# Patient Record
Sex: FEMALE | Race: BLACK OR AFRICAN AMERICAN | URBAN - METROPOLITAN AREA
[De-identification: names, ages, dates, MRNs, and addresses within clinical notes are randomized per-mention and may not be internally consistent; named-entity substitution may affect disease eponyms.]

---

## 2017-05-10 LAB
ABO + RH BLD: NORMAL
ABO + RH BLD: NORMAL
BLD GP AB SCN SERPL QL: NORMAL
C TRACH DNA SPEC QL PROBE+SIG AMP: NORMAL
CULTURE MICRO: NORMAL
HBV SURFACE AG SERPL QL IA: NORMAL
HEMOGLOBIN: 12.3 G/DL (ref 11.7–15.7)
HIV 1+2 AB+HIV1 P24 AG SERPL QL IA: NORMAL
N GONORRHOEA DNA SPEC QL PROBE+SIG AMP: NORMAL
PLATELET # BLD AUTO: 347 10^9/L
RUBELLA ANTIBODY IGG QUANTITATIVE: NORMAL IU/ML
T PALLIDUM IGG SER QL: NORMAL

## 2017-10-11 ENCOUNTER — PRENATAL OFFICE VISIT (OUTPATIENT)
Dept: OBGYN | Facility: CLINIC | Age: 22
End: 2017-10-11

## 2017-10-11 DIAGNOSIS — Z53.9 NO SHOW: Primary | ICD-10-CM

## 2017-10-11 NOTE — MR AVS SNAPSHOT
"              After Visit Summary   10/11/2017    Ny Carson    MRN: [id number]           Patient Information     Date Of Birth          1995        Visit Information        Provider Department      10/11/2017 1:00 PM Artem Tiwari APRN CNM [facility]        Today's Diagnoses     NO SHOW    -  1       Follow-ups after your visit        Who to contact     If you have questions or need follow up information about today's clinic visit or your schedule please contact [facility] directly at [phone].  Normal or non-critical lab and imaging results will be communicated to you by Skycurehart, letter or phone within 4 business days after the clinic has received the results. If you do not hear from us within 7 days, please contact the clinic through [facility] or phone. If you have a critical or abnormal lab result, we will notify you by phone as soon as possible.  Submit refill requests through PlayCrafter or call your pharmacy and they will forward the refill request to us. Please allow 3 business days for your refill to be completed.          Additional Information About Your Visit        MyChart Information     PlayCrafter lets you send messages to your doctor, view your test results, renew your prescriptions, schedule appointments and more. To sign up, go to [email or web address] . Click on \"Log in\" on the left side of the screen, which will take you to the Welcome page. Then click on \"Sign up Now\" on the right side of the page.     You will be asked to enter the access code listed below, as well as some personal information. Please follow the directions to create your username and password.     Your access code is: [email or web address]  Expires: 2018  3:14 PM     Your access code will  in 90 days. If you need help or a new code, please call your [facility] or [phone].        Care EveryWhere ID     This is your Care EveryWhere ID. This could be used by other organizations to " access your [place] medical records  [id number]         Blood Pressure from Last 3 Encounters:   No data found for BP    Weight from Last 3 Encounters:   No data found for Wt              We Performed the Following     ABO and Rh     Anti Treponema     CBC with platelets     Chlamydia trachomatis PCR     Hepatitis B surface antigen     HIV Antigen Antibody Combo     Neisseria gonorrhoeae PCR     OB hemoglobin     Rubella Antibody IgG Quantitative     Urine Culture Aerobic Bacterial        Primary Care Provider    None Specified       No primary provider on file.        Equal Access to Services     MARTIN VELEZ : Hadii aad ku hadasho Soomaali, waaxda luqadaha, qaybta kaalmada adeegyada, waxay idiin hayfransiscon adeeg deborah laTerryfransiscon . So [facility] [phone].    ATENCIÓN: Si habla alejandro, tiene a reece disposición servicios gratuitos de asistencia lingüística. Llame al [phone].    We comply with applicable federal civil rights laws and [place] laws. We do not discriminate on the basis of race, color, national origin, age, disability, sex, sexual orientation, or gender identity.            Thank you!     Thank you for choosing [facility]  for your care. Our goal is always to provide you with excellent care. Hearing back from our patients is one way we can continue to improve our services. Please take a few minutes to complete the written survey that you may receive in the mail after your visit with us. Thank you!             Your Updated Medication List - Protect others around you: Learn how to safely use, store and throw away your medicines at www.disposemymeds.org.      Notice  As of 10/11/2017  3:14 PM    You have not been prescribed any medications.

## 2017-10-11 NOTE — PROGRESS NOTES
This patient was a no show for this scheduled appointment.   No show for Transfer of Care visit.    JOSÉ

## 2017-11-08 ENCOUNTER — PRENATAL OFFICE VISIT (OUTPATIENT)
Dept: OBGYN | Facility: CLINIC | Age: 22
End: 2017-11-08
Payer: COMMERCIAL

## 2017-11-08 VITALS
WEIGHT: 219.5 LBS | DIASTOLIC BLOOD PRESSURE: 70 MMHG | HEIGHT: 63 IN | BODY MASS INDEX: 38.89 KG/M2 | SYSTOLIC BLOOD PRESSURE: 122 MMHG

## 2017-11-08 DIAGNOSIS — E55.9 VITAMIN D DEFICIENCY: ICD-10-CM

## 2017-11-08 DIAGNOSIS — Z34.80 NORMAL PREGNANCY IN MULTIGRAVIDA: ICD-10-CM

## 2017-11-08 PROBLEM — Z28.39 SUSCEPTIBLE TO VARICELLA (NON-IMMUNE), CURRENTLY PREGNANT: Status: ACTIVE | Noted: 2017-05-17

## 2017-11-08 PROBLEM — Z23 NEED FOR TDAP VACCINATION: Status: ACTIVE | Noted: 2017-11-08

## 2017-11-08 PROBLEM — O09.899 SUSCEPTIBLE TO VARICELLA (NON-IMMUNE), CURRENTLY PREGNANT: Status: ACTIVE | Noted: 2017-05-17

## 2017-11-08 PROCEDURE — 99207 ZZC PRENATAL VISIT: CPT | Performed by: ADVANCED PRACTICE MIDWIFE

## 2017-11-08 ASSESSMENT — ANXIETY QUESTIONNAIRES
5. BEING SO RESTLESS THAT IT IS HARD TO SIT STILL: SEVERAL DAYS
GAD7 TOTAL SCORE: 11
3. WORRYING TOO MUCH ABOUT DIFFERENT THINGS: MORE THAN HALF THE DAYS
2. NOT BEING ABLE TO STOP OR CONTROL WORRYING: SEVERAL DAYS
IF YOU CHECKED OFF ANY PROBLEMS ON THIS QUESTIONNAIRE, HOW DIFFICULT HAVE THESE PROBLEMS MADE IT FOR YOU TO DO YOUR WORK, TAKE CARE OF THINGS AT HOME, OR GET ALONG WITH OTHER PEOPLE: SOMEWHAT DIFFICULT
7. FEELING AFRAID AS IF SOMETHING AWFUL MIGHT HAPPEN: MORE THAN HALF THE DAYS
6. BECOMING EASILY ANNOYED OR IRRITABLE: MORE THAN HALF THE DAYS
1. FEELING NERVOUS, ANXIOUS, OR ON EDGE: SEVERAL DAYS

## 2017-11-08 ASSESSMENT — PATIENT HEALTH QUESTIONNAIRE - PHQ9
SUM OF ALL RESPONSES TO PHQ QUESTIONS 1-9: 3
5. POOR APPETITE OR OVEREATING: MORE THAN HALF THE DAYS

## 2017-11-08 NOTE — PROGRESS NOTES
32w4d  Patient here at [facility] for transfer of care from [facility] and [facility]. States had 1 hour GCT at [place] attempting to get lab works done.  Began care 6/7/17 @ [facility] at 11 weeks gestation.   Pt states regular monthly appts. There. Not seen on transfer records at this time.     PHQ 9 today; 3  ALISHA 7 = 12, states feels more anxiety because she had a nice normal birth last time and says she is very nervous this will not happen again.  Reassured.  Pt had  last time at [facility] and is interested in a  this time. Will give  program information.   Pt states has her mother with her usually but sometimes makes her more nervous.   Reviewed what JULES che MD does and would still encourage  if she would like.  CNM service discussed.  Pt stopped smoking with this pregnancy.  Agrees to 049 study. Patient works in  usually sitting.   RTC in 2 weeks mrb  Please discuss at next visit  Vitamin D daily, need for Tdap. mrb    Pelvis proven to 6 lbs 8 oz.  mrb

## 2017-11-08 NOTE — MR AVS SNAPSHOT
"              After Visit Summary   2017    Ny Carson    MRN: [id number]           Patient Information     Date Of Birth          1995        Visit Information        Provider Department      2017 1:30 PM Audrey Parker APRN CNM [facility]        Today's Diagnoses     Normal pregnancy in multigravida        Vitamin D deficiency           Follow-ups after your visit        Who to contact     If you have questions or need follow up information about today's clinic visit or your schedule please contact [facility] directly at [phone].  Normal or non-critical lab and imaging results will be communicated to you by Coupadhart, letter or phone within 4 business days after the clinic has received the results. If you do not hear from us within 7 days, please contact the clinic through Coupadhart or phone. If you have a critical or abnormal lab result, we will notify you by phone as soon as possible.  Submit refill requests through Enthuse or call your pharmacy and they will forward the refill request to us. Please allow 3 business days for your refill to be completed.          Additional Information About Your Visit        MyChart Information     Enthuse lets you send messages to your doctor, view your test results, renew your prescriptions, schedule appointments and more. To sign up, go to [email or web address] . Click on \"Log in\" on the left side of the screen, which will take you to the Welcome page. Then click on \"Sign up Now\" on the right side of the page.     You will be asked to enter the access code listed below, as well as some personal information. Please follow the directions to create your username and password.     Your access code is: [email or web address]  Expires: 2018  2:14 PM     Your access code will  in 90 days. If you need help or a new code, please call your [facility] or [phone].        Care EveryWhere ID     This is your Care " "EveryWhere ID. This could be used by other organizations to access your [place] medical records  [id number]        Your Vitals Were     Height BMI (Body Mass Index)                5' 2.5\" (1.588 m) 39.51 kg/m2           Blood Pressure from Last 3 Encounters:   11/08/17 122/70    Weight from Last 3 Encounters:   11/08/17 219 lb 8 oz (99.6 kg)              Today, you had the following     No orders found for display       Primary Care Provider    Physician No Ref-Primary       NO REF-PRIMARY PHYSICIAN        Equal Access to Services     [facility]ARTIN [facility] : Hadii aad ku hadasho Soomaali, waaxda luqadaha, qaybta kaalmada adeegyada, treva tirado . So [facility] [phone].    ATENCIÓN: Si habla español, tiene a reece disposición servicios gratuitos de asistencia lingüística. Llame al [phone].    We comply with applicable federal civil rights laws and [place] laws. We do not discriminate on the basis of race, color, national origin, age, disability, sex, sexual orientation, or gender identity.            Thank you!     Thank you for choosing [facility]  for your care. Our goal is always to provide you with excellent care. Hearing back from our patients is one way we can continue to improve our services. Please take a few minutes to complete the written survey that you may receive in the mail after your visit with us. Thank you!             Your Updated Medication List - Protect others around you: Learn how to safely use, store and throw away your medicines at www.disposemymeds.org.      Notice  As of 11/8/2017  3:52 PM    You have not been prescribed any medications.      "

## 2017-11-09 ASSESSMENT — ANXIETY QUESTIONNAIRES: GAD7 TOTAL SCORE: 11

## 2017-11-29 ENCOUNTER — PRENATAL OFFICE VISIT (OUTPATIENT)
Dept: OBGYN | Facility: CLINIC | Age: 22
End: 2017-11-29
Payer: COMMERCIAL

## 2017-11-29 VITALS — DIASTOLIC BLOOD PRESSURE: 62 MMHG | WEIGHT: 216 LBS | BODY MASS INDEX: 38.88 KG/M2 | SYSTOLIC BLOOD PRESSURE: 112 MMHG

## 2017-11-29 DIAGNOSIS — Z34.80 NORMAL PREGNANCY IN MULTIGRAVIDA: Primary | ICD-10-CM

## 2017-11-29 LAB — HEMOGLOBIN: 10.9 G/DL (ref 11.7–15.7)

## 2017-11-29 PROCEDURE — 87591 N.GONORRHOEAE DNA AMP PROB: CPT | Performed by: ADVANCED PRACTICE MIDWIFE

## 2017-11-29 PROCEDURE — 99207 ZZC PRENATAL VISIT: CPT | Performed by: ADVANCED PRACTICE MIDWIFE

## 2017-11-29 PROCEDURE — 87491 CHLMYD TRACH DNA AMP PROBE: CPT | Performed by: ADVANCED PRACTICE MIDWIFE

## 2017-11-29 PROCEDURE — 87186 SC STD MICRODIL/AGAR DIL: CPT | Performed by: ADVANCED PRACTICE MIDWIFE

## 2017-11-29 PROCEDURE — 87653 STREP B DNA AMP PROBE: CPT | Performed by: ADVANCED PRACTICE MIDWIFE

## 2017-11-29 PROCEDURE — 00000218 HCL OB HEMOGLOBIN (NS/SS): Performed by: ADVANCED PRACTICE MIDWIFE

## 2017-11-29 PROCEDURE — 36415 COLL VENOUS BLD VENIPUNCTURE: CPT | Performed by: ADVANCED PRACTICE MIDWIFE

## 2017-11-29 NOTE — PROGRESS NOTES
Here today for PNV.  36 week labs done.  Is having some ctx that are regular and has noted cervical mucous.  Working but has been off 2 days with sick son at home with Dx of pneumonia and on antibiotics.  Fever controlled.    ASSESSMENT: 35w4d   PLAN: RTC weekly   GC/Chlamydia/GBS today.  Needs Tdap as not done at [place].    JOSÉ

## 2017-11-29 NOTE — MR AVS SNAPSHOT
"              After Visit Summary   2017    Ny Carson    MRN: [id number]           Patient Information     Date Of Birth          1995        Visit Information        Provider Department      2017 2:30 PM Artem Tiwari APRN [facility]        Today's Diagnoses     Normal pregnancy in multigravida    -  1       Follow-ups after your visit        Who to contact     If you have questions or need follow up information about today's clinic visit or your schedule please contact [facility] directly at [phone].  Normal or non-critical lab and imaging results will be communicated to you by Targeted Instant Communicationshart, letter or phone within 4 business days after the clinic has received the results. If you do not hear from us within 7 days, please contact the clinic through [employer]Cast Networkst or phone. If you have a critical or abnormal lab result, we will notify you by phone as soon as possible.  Submit refill requests through [employer] or call your pharmacy and they will forward the refill request to us. Please allow 3 business days for your refill to be completed.          Additional Information About Your Visit        MyChart Information     [employer] lets you send messages to your doctor, view your test results, renew your prescriptions, schedule appointments and more. To sign up, go to [email or web address] . Click on \"Log in\" on the left side of the screen, which will take you to the Welcome page. Then click on \"Sign up Now\" on the right side of the page.     You will be asked to enter the access code listed below, as well as some personal information. Please follow the directions to create your username and password.     Your access code is: [email or web address]  Expires: 2018  2:14 PM     Your access code will  in 90 days. If you need help or a new code, please call your [facility] or [phone].        Care EveryWhere ID     This is your Care EveryWhere ID. This could be used " by other organizations to access your [place] medical records  [id number]        Your Vitals Were     BMI (Body Mass Index)                   38.88 kg/m2            Blood Pressure from Last 3 Encounters:   11/29/17 112/62   11/08/17 122/70    Weight from Last 3 Encounters:   11/29/17 216 lb (98 kg)   11/08/17 219 lb 8 oz (99.6 kg)              We Performed the Following     CHLAMYDIA TRACHOMATIS PCR     Group B strep PCR     NEISSERIA GONORRHOEA PCR     OB HEMOGLOBIN (NS/SS)        Primary Care Provider Fax #    Physician No Ref-Primary [phone]       No address on file        Equal Access to Services     ADELINA VELEZ : Hadjacinda Lyons, jose ritter, desiree fierro, treva tirado . So [facility] [phone].    ATENCIÓN: Si habla español, tiene a reece disposición servicios gratuitos de asistencia lingüística. Llame al [phone].    We comply with applicable federal civil rights laws and [place] laws. We do not discriminate on the basis of race, color, national origin, age, disability, sex, sexual orientation, or gender identity.            Thank you!     Thank you for choosing [facility]  for your care. Our goal is always to provide you with excellent care. Hearing back from our patients is one way we can continue to improve our services. Please take a few minutes to complete the written survey that you may receive in the mail after your visit with us. Thank you!             Your Updated Medication List - Protect others around you: Learn how to safely use, store and throw away your medicines at www.disposemymeds.org.      Notice  As of 11/29/2017  3:48 PM    You have not been prescribed any medications.

## 2017-11-30 LAB
C TRACH DNA SPEC QL NAA+PROBE: NEGATIVE
GP B STREP DNA SPEC QL NAA+PROBE: POSITIVE
N GONORRHOEA DNA SPEC QL NAA+PROBE: NEGATIVE
SPECIMEN SOURCE: ABNORMAL
SPECIMEN SOURCE: NORMAL
SPECIMEN SOURCE: NORMAL

## 2017-12-01 PROBLEM — O98.819 GROUP B STREPTOCOCCAL INFECTION DURING PREGNANCY: Status: ACTIVE | Noted: 2017-12-01

## 2017-12-01 PROBLEM — B95.1 GROUP B STREPTOCOCCAL INFECTION DURING PREGNANCY: Status: ACTIVE | Noted: 2017-12-01

## 2017-12-05 LAB
BACTERIA SPEC CULT: ABNORMAL
SPECIMEN SOURCE: ABNORMAL

## 2017-12-06 ENCOUNTER — PRENATAL OFFICE VISIT (OUTPATIENT)
Dept: OBGYN | Facility: CLINIC | Age: 22
End: 2017-12-06
Payer: COMMERCIAL

## 2017-12-06 VITALS — WEIGHT: 218 LBS | SYSTOLIC BLOOD PRESSURE: 118 MMHG | BODY MASS INDEX: 39.24 KG/M2 | DIASTOLIC BLOOD PRESSURE: 70 MMHG

## 2017-12-06 DIAGNOSIS — Z23 NEED FOR TDAP VACCINATION: ICD-10-CM

## 2017-12-06 DIAGNOSIS — B00.9 HSV (HERPES SIMPLEX VIRUS) INFECTION: ICD-10-CM

## 2017-12-06 DIAGNOSIS — B37.31 YEAST INFECTION OF THE VAGINA: Primary | ICD-10-CM

## 2017-12-06 DIAGNOSIS — Z34.80 NORMAL PREGNANCY IN MULTIGRAVIDA: ICD-10-CM

## 2017-12-06 PROCEDURE — 99207 ZZC PRENATAL VISIT: CPT | Performed by: ADVANCED PRACTICE MIDWIFE

## 2017-12-06 RX ORDER — VALACYCLOVIR HYDROCHLORIDE 500 MG/1
500 TABLET, FILM COATED ORAL 2 TIMES DAILY
Qty: 60 TABLET | Refills: 1 | Status: SHIPPED | OUTPATIENT
Start: 2017-12-06 | End: 2017-12-13

## 2017-12-06 RX ORDER — MICONAZOLE NITRATE 2 %
1 CREAM WITH APPLICATOR VAGINAL AT BEDTIME
Qty: 45 G | Refills: 1 | Status: ON HOLD | OUTPATIENT
Start: 2017-12-06 | End: 2017-12-25

## 2017-12-06 NOTE — MR AVS SNAPSHOT
"              After Visit Summary   2017    Ny Carson    MRN: [id number]           Patient Information     Date Of Birth          1995        Visit Information        Provider Department      2017 3:00 PM Audrey Parker APRN CNM [facility]        Today's Diagnoses     Yeast infection of the vagina    -  1    Normal pregnancy in multigravida        HSV (herpes simplex virus) infection        Need for Tdap vaccination           Follow-ups after your visit        Who to contact     If you have questions or need follow up information about today's clinic visit or your schedule please contact [facility] directly at [phone].  Normal or non-critical lab and imaging results will be communicated to you by VIDDIXhart, letter or phone within 4 business days after the clinic has received the results. If you do not hear from us within 7 days, please contact the clinic through MyChart or phone. If you have a critical or abnormal lab result, we will notify you by phone as soon as possible.  Submit refill requests through Waicai or call your pharmacy and they will forward the refill request to us. Please allow 3 business days for your refill to be completed.          Additional Information About Your Visit        MyChart Information     Waicai lets you send messages to your doctor, view your test results, renew your prescriptions, schedule appointments and more. To sign up, go to [email or web address] . Click on \"Log in\" on the left side of the screen, which will take you to the Welcome page. Then click on \"Sign up Now\" on the right side of the page.     You will be asked to enter the access code listed below, as well as some personal information. Please follow the directions to create your username and password.     Your access code is: [email or web address]  Expires: 2018  2:14 PM     Your access code will  in 90 days. If you need help or a new code, please " call your [place] clinic or [phone].        Care EveryWhere ID     This is your Care EveryWhere ID. This could be used by other organizations to access your [place] medical records  [id number]        Your Vitals Were     BMI (Body Mass Index)                   39.24 kg/m2            Blood Pressure from Last 3 Encounters:   12/06/17 118/70   11/29/17 112/62   11/08/17 122/70    Weight from Last 3 Encounters:   12/06/17 218 lb (98.9 kg)   11/29/17 216 lb (98 kg)   11/08/17 219 lb 8 oz (99.6 kg)              We Performed the Following     Wet prep          Today's Medication Changes          These changes are accurate as of: 12/6/17  3:48 PM.  If you have any questions, ask your nurse or doctor.               Start taking these medicines.        Dose/Directions    miconazole 2 % cream   Commonly known as:  CVS MICONAZOLE 7   Used for:  Yeast infection of the vagina        Dose:  1 applicator   Place 1 applicator vaginally At Bedtime   Quantity:  45 g   Refills:  1       valACYclovir 500 MG tablet   Commonly known as:  VALTREX   Used for:  HSV (herpes simplex virus) infection, Normal pregnancy in multigravida        Dose:  500 mg   Take 1 tablet (500 mg) by mouth 2 times daily   Quantity:  60 tablet   Refills:  1            Where to get your medicines      These medications were sent to [facility] [address] [place] [address] [address] [address]     Phone:  [phone]     miconazole 2 % cream         Some of these will need a paper prescription and others can be bought over the counter.  Ask your nurse if you have questions.     Bring a paper prescription for each of these medications     valACYclovir 500 MG tablet                Primary Care Provider Fax #    Physician No Ref-Primary [phone]       No address on file        Equal Access to Services     MARTIN VELEZ AH: jose Brenner, desiree cadena  treva fierrorhona jesus albertodaylin aguileraaan ah. Catherine [facility] [phone].    ATENCIÓN: Si habla alejandro, tiene a reece disposición servicios gratuitos de asistencia lingüística. Kevon al [phone].    We comply with applicable federal civil rights laws and [place] laws. We do not discriminate on the basis of race, color, national origin, age, disability, sex, sexual orientation, or gender identity.            Thank you!     Thank you for choosing [facility]  for your care. Our goal is always to provide you with excellent care. Hearing back from our patients is one way we can continue to improve our services. Please take a few minutes to complete the written survey that you may receive in the mail after your visit with us. Thank you!             Your Updated Medication List - Protect others around you: Learn how to safely use, store and throw away your medicines at www.disposemymeds.org.          This list is accurate as of: 12/6/17  3:48 PM.  Always use your most recent med list.                   Brand Name Dispense Instructions for use Diagnosis    miconazole 2 % cream    CVS MICONAZOLE 7    45 g    Place 1 applicator vaginally At Bedtime    Yeast infection of the vagina       valACYclovir 500 MG tablet    VALTREX    60 tablet    Take 1 tablet (500 mg) by mouth 2 times daily    HSV (herpes simplex virus) infection, Normal pregnancy in multigravida

## 2017-12-06 NOTE — PROGRESS NOTES
36w4d  Reviewed + GBS pt has had with last pregnancy aware of antibiotics.  Pt feels she has yeast infection, vaginal itching, irritation, denies odor.  Wet prep + yeast, will give monostat 7 with instructions.   Tdap given today.  Labor signs and symptoms reviewed.  Pt states she needs prophylaxis for herpes, not noted on problem list but gave prescription for suppression starting today.    rtc in 1 week mrb

## 2017-12-08 LAB
SPECIMEN SOURCE: NORMAL
WET PREP SPEC: NORMAL

## 2017-12-13 ENCOUNTER — PRENATAL OFFICE VISIT (OUTPATIENT)
Dept: OBGYN | Facility: CLINIC | Age: 22
End: 2017-12-13
Payer: COMMERCIAL

## 2017-12-13 VITALS — DIASTOLIC BLOOD PRESSURE: 60 MMHG | SYSTOLIC BLOOD PRESSURE: 112 MMHG | WEIGHT: 217 LBS | BODY MASS INDEX: 39.06 KG/M2

## 2017-12-13 DIAGNOSIS — B00.9 HSV (HERPES SIMPLEX VIRUS) INFECTION: ICD-10-CM

## 2017-12-13 DIAGNOSIS — O98.819 GROUP B STREPTOCOCCAL INFECTION DURING PREGNANCY: Primary | ICD-10-CM

## 2017-12-13 DIAGNOSIS — B95.1 GROUP B STREPTOCOCCAL INFECTION DURING PREGNANCY: Primary | ICD-10-CM

## 2017-12-13 DIAGNOSIS — Z34.80 NORMAL PREGNANCY IN MULTIGRAVIDA: ICD-10-CM

## 2017-12-13 PROCEDURE — 99207 ZZC PRENATAL VISIT: CPT | Performed by: ADVANCED PRACTICE MIDWIFE

## 2017-12-13 RX ORDER — VALACYCLOVIR HYDROCHLORIDE 500 MG/1
500 TABLET, FILM COATED ORAL 2 TIMES DAILY
Qty: 60 TABLET | Refills: 1 | Status: ON HOLD | OUTPATIENT
Start: 2017-12-13 | End: 2017-12-25

## 2017-12-13 NOTE — PROGRESS NOTES
Doing well.  Did not get Valtrex as not at Pharmacy so given both a paper Rx and a reorder electronically to [facility].  Denies eruption this pregnancy.  Took Yeast meds that did go to Pharmacy.  Some L side pain that she went to [facility] and dx of lateral rectus abdomen pain.  No regular ctx.  Did not have pelvic exam.   ASSESSMENT: 37w4d   HSV + hx, GBS +,  PLAN: RTC weekly until delivery.   JOSÉ

## 2017-12-13 NOTE — MR AVS SNAPSHOT
"              After Visit Summary   2017    Ny Carson    MRN: [id number]           Patient Information     Date Of Birth          1995        Visit Information        Provider Department      2017 2:30 PM Artem Tiwari APRN CN[facility]        Today's Diagnoses     Group B streptococcal infection during pregnancy    -  1    HSV (herpes simplex virus) infection        Normal pregnancy in multigravida           Follow-ups after your visit        Who to contact     If you have questions or need follow up information about today's clinic visit or your schedule please contact [facility] directly at [phone].  Normal or non-critical lab and imaging results will be communicated to you by PharmacoPhotonicshart, letter or phone within 4 business days after the clinic has received the results. If you do not hear from us within 7 days, please contact the clinic through PharmacoPhotonicshart or phone. If you have a critical or abnormal lab result, we will notify you by phone as soon as possible.  Submit refill requests through [employer] Resolve or call your pharmacy and they will forward the refill request to us. Please allow 3 business days for your refill to be completed.          Additional Information About Your Visit        MyChart Information     [employer] lets you send messages to your doctor, view your test results, renew your prescriptions, schedule appointments and more. To sign up, go to [email or web address] . Click on \"Log in\" on the left side of the screen, which will take you to the Welcome page. Then click on \"Sign up Now\" on the right side of the page.     You will be asked to enter the access code listed below, as well as some personal information. Please follow the directions to create your username and password.     Your access code is: [email or web address]  Expires: 2018  2:14 PM     Your access code will  in 90 days. If you need help or a new code, please call your [place] " clinic or [phone].        Care EveryWhere ID     This is your Care EveryWhere ID. This could be used by other organizations to access your [place] medical records  [id number]        Your Vitals Were     BMI (Body Mass Index)                   39.06 kg/m2            Blood Pressure from Last 3 Encounters:   12/13/17 112/60   12/06/17 118/70   11/29/17 112/62    Weight from Last 3 Encounters:   12/13/17 217 lb (98.4 kg)   12/06/17 218 lb (98.9 kg)   11/29/17 216 lb (98 kg)              Today, you had the following     No orders found for display         Where to get your medicines      These medications were sent to [facility] [address] [place] [address], [place] [address]  [address] [facility] [address]     Phone:  [phone]     valACYclovir 500 MG tablet          Primary Care Provider Fax #    Physician No Ref-Primary [phone]       No address on file        Equal Access to Services     MARTIN EVLEZ : Hadii angle ku hadasho Soomaali, waaxda luqadaha, qaybta kaalmada adeegyada, waxay markin rinku tirado . So [facility] [phone].    ATENCIÓN: Si habla español, tiene a reece disposición servicios gratuitos de asistencia lingüística. LlRegency [facility] West [phone].    We comply with applicable federal civil rights laws and [place] laws. We do not discriminate on the basis of race, color, national origin, age, disability, sex, sexual orientation, or gender identity.            Thank you!     Thank you for choosing [facility]  for your care. Our goal is always to provide you with excellent care. Hearing back from our patients is one way we can continue to improve our services. Please take a few minutes to complete the written survey that you may receive in the mail after your visit with us. Thank you!             Your Updated Medication List - Protect others around you: Learn how to safely use, store and throw away your medicines at  www.disposemymeds.org.          This list is accurate as of: 12/13/17  3:09 PM.  Always use your most recent med list.                   Brand Name Dispense Instructions for use Diagnosis    miconazole 2 % cream    CVS MICONAZOLE 7    45 g    Place 1 applicator vaginally At Bedtime    Yeast infection of the vagina       valACYclovir 500 MG tablet    VALTREX    60 tablet    Take 1 tablet (500 mg) by mouth 2 times daily    HSV (herpes simplex virus) infection, Normal pregnancy in multigravida

## 2017-12-20 ENCOUNTER — PRENATAL OFFICE VISIT (OUTPATIENT)
Dept: OBGYN | Facility: CLINIC | Age: 22
End: 2017-12-20
Payer: COMMERCIAL

## 2017-12-20 VITALS — DIASTOLIC BLOOD PRESSURE: 60 MMHG | SYSTOLIC BLOOD PRESSURE: 112 MMHG | BODY MASS INDEX: 39.06 KG/M2 | WEIGHT: 217 LBS

## 2017-12-20 DIAGNOSIS — B00.9 HSV (HERPES SIMPLEX VIRUS) INFECTION: ICD-10-CM

## 2017-12-20 DIAGNOSIS — Z34.80 NORMAL PREGNANCY IN MULTIGRAVIDA: Primary | ICD-10-CM

## 2017-12-20 DIAGNOSIS — O98.819 GROUP B STREPTOCOCCAL INFECTION DURING PREGNANCY: ICD-10-CM

## 2017-12-20 DIAGNOSIS — B95.1 GROUP B STREPTOCOCCAL INFECTION DURING PREGNANCY: ICD-10-CM

## 2017-12-20 PROCEDURE — 59425 ANTEPARTUM CARE ONLY: CPT | Performed by: ADVANCED PRACTICE MIDWIFE

## 2017-12-20 PROCEDURE — 99207 ZZC PRENATAL VISIT: CPT | Performed by: ADVANCED PRACTICE MIDWIFE

## 2017-12-20 NOTE — MR AVS SNAPSHOT
"              After Visit Summary   2017    Ny Carson    MRN: [id number]           Patient Information     Date Of Birth          1995        Visit Information        Provider Department      2017 3:00 PM Artem Tiwari APRN CNM [facility]        Today's Diagnoses     Normal pregnancy in multigravida    -  1    HSV (herpes simplex virus) infection        Group B streptococcal infection during pregnancy           Follow-ups after your visit        Who to contact     If you have questions or need follow up information about today's clinic visit or your schedule please contact [facility] directly at [phone].  Normal or non-critical lab and imaging results will be communicated to you by [employer], letter or phone within 4 business days after the clinic has received the results. If you do not hear from us within 7 days, please contact the clinic through [employer] or phone. If you have a critical or abnormal lab result, we will notify you by phone as soon as possible.  Submit refill requests through [employer] or call your pharmacy and they will forward the refill request to us. Please allow 3 business days for your refill to be completed.          Additional Information About Your Visit        MyChart Information     [employer] lets you send messages to your doctor, view your test results, renew your prescriptions, schedule appointments and more. To sign up, go to [email or web address] . Click on \"Log in\" on the left side of the screen, which will take you to the Welcome page. Then click on \"Sign up Now\" on the right side of the page.     You will be asked to enter the access code listed below, as well as some personal information. Please follow the directions to create your username and password.     Your access code is: [email or web address]  Expires: 2018  2:14 PM     Your access code will  in 90 days. If you need help or a new code, please call your [place] " clinic or [phone].        Care EveryWhere ID     This is your Care EveryWhere ID. This could be used by other organizations to access your [facility] medical records  [id number]        Your Vitals Were     BMI (Body Mass Index)                   39.06 kg/m2            Blood Pressure from Last 3 Encounters:   12/20/17 112/60   12/13/17 112/60   12/06/17 118/70    Weight from Last 3 Encounters:   12/20/17 217 lb (98.4 kg)   12/13/17 217 lb (98.4 kg)   12/06/17 218 lb (98.9 kg)              Today, you had the following     No orders found for display       Primary Care Provider Fax #    Physician No Ref-Primary [phone]       No address on file        Equal Access to Services     MARTIN VELEZ : Marielle fuchso Serena, waaxda luqadaha, qaybta kaalmada aderhonayachrista, treva tirado . So [facility] [phone].    ATENCIÓN: Si habla español, tiene a reece disposición servicios gratuitos de asistencia lingüística. Llame al [phone].    We comply with applicable federal civil rights laws and [place] laws. We do not discriminate on the basis of race, color, national origin, age, disability, sex, sexual orientation, or gender identity.            Thank you!     Thank you for choosing [facility]  for your care. Our goal is always to provide you with excellent care. Hearing back from our patients is one way we can continue to improve our services. Please take a few minutes to complete the written survey that you may receive in the mail after your visit with us. Thank you!             Your Updated Medication List - Protect others around you: Learn how to safely use, store and throw away your medicines at www.disposemymeds.org.          This list is accurate as of: 12/20/17  4:12 PM.  Always use your most recent med list.                   Brand Name Dispense Instructions for use Diagnosis    miconazole 2 % cream    CVS MICONAZOLE 7    45 g    Place 1 applicator vaginally At  Bedtime    Yeast infection of the vagina       valACYclovir 500 MG tablet    VALTREX    60 tablet    Take 1 tablet (500 mg) by mouth 2 times daily    HSV (herpes simplex virus) infection, Normal pregnancy in multigravida

## 2017-12-20 NOTE — PROGRESS NOTES
Some light ctx and increased cervical mucous.  Had headaches x 3 that resolved with Tylenol.  No hx of GHTN in past and BP is very normal today.  Discussed to call if HA and not controlled by Tylenol.  # to call for labor.  Is taking Valtrex for HSV suppression.  Active fetus.  GBS positive reviewed to call if SROM.   ASSESSMENT: 38w4d   GBS, HSV.  PLAN: RTC weekly.   JOSÉ

## 2017-12-23 ENCOUNTER — HOSPITAL ENCOUNTER (OUTPATIENT)
Facility: CLINIC | Age: 22
Discharge: HOME | End: 2017-12-23
Attending: ADVANCED PRACTICE MIDWIFE | Admitting: ADVANCED PRACTICE MIDWIFE
Payer: COMMERCIAL

## 2017-12-23 VITALS
HEIGHT: 62 IN | WEIGHT: 214 LBS | RESPIRATION RATE: 18 BRPM | DIASTOLIC BLOOD PRESSURE: 77 MMHG | HEART RATE: 94 BPM | BODY MASS INDEX: 39.38 KG/M2 | SYSTOLIC BLOOD PRESSURE: 129 MMHG | TEMPERATURE: 97.8 F

## 2017-12-23 PROCEDURE — 59025 FETAL NON-STRESS TEST: CPT | Mod: 26 | Performed by: ADVANCED PRACTICE MIDWIFE

## 2017-12-23 PROCEDURE — 59025 FETAL NON-STRESS TEST: CPT

## 2017-12-23 PROCEDURE — 99213 OFFICE O/P EST LOW 20 MIN: CPT | Mod: 25

## 2017-12-23 PROCEDURE — 99213 OFFICE O/P EST LOW 20 MIN: CPT | Mod: 25 | Performed by: ADVANCED PRACTICE MIDWIFE

## 2017-12-23 RX ORDER — ONDANSETRON 2 MG/ML
4 INJECTION INTRAMUSCULAR; INTRAVENOUS EVERY 6 HOURS PRN
Status: DISCONTINUED | OUTPATIENT
Start: 2017-12-23 | End: 2017-12-24 | Stop reason: HOSPADM

## 2017-12-23 NOTE — IP AVS SNAPSHOT
[facility]    [address]    [place] [address]    Phone:  [phone]                                       After Visit Summary   12/23/2017    Ny Carson    MRN: [id number]           After Visit Summary Signature Page     I have received my discharge instructions, and my questions have been answered. I have discussed any challenges I see with this plan with the nurse or doctor.    ..........................................................................................................................................  Patient/Patient Representative Signature      ..........................................................................................................................................  Patient Representative Print Name and Relationship to Patient    ..................................................               ................................................  Date                                            Time    ..........................................................................................................................................  Reviewed by Signature/Title    ...................................................              ..............................................  Date                                                            Time

## 2017-12-23 NOTE — IP AVS SNAPSHOT
MRN:[id number]                      After Visit Summary   12/23/2017    Ny Carson    MRN: [id number]           Thank you!     Thank you for choosing [facility] for your care. Our goal is always to provide you with excellent care. Hearing back from our patients is one way we can continue to improve our services. Please take a few minutes to complete the written survey that you may receive in the mail after you visit with us. Thank you!        Patient Information     Date Of Birth          1995        Designated Caregiver       Most Recent Value    Caregiver    Will someone help with your care after discharge? no      About your hospital stay     You were admitted on:  December 23, 2017 You last received care in the:  [facility]    You were discharged on:  December 23, 2017       Who to Call     For medical emergencies, please call 911.  For non-urgent questions about your medical care, please call your primary care provider or clinic, None          Attending Provider     Provider Artem Guerrero APRN [facility]       Primary Care Provider Fax #    Physician No Ref-Primary [phone]      Further instructions from your care team       Discharge Instruction for Undelivered Patients      You were seen for: Labor Assessment  We Consulted: Artem Tiwari CNM  You had (Test or Medicine): Fetal Monitoring and Cervical Exam     Diet:   Drink 8 to 12 glasses of liquids (milk, juice, water) every day.     Activity:  Call your doctor or nurse midwife if your baby is moving less than usual.     Call your provider if you notice:  Swelling in your face or increased swelling in your hands or legs.  Headaches that are not relieved by Tylenol (acetaminophen).  Changes in your vision (blurring: seeing spots or stars.)  Nausea (sick to your stomach) and vomiting (throwing up).   Weight gain of 5 pounds or more per week.  Heartburn that doesn't go away.  Signs of bladder infection: pain when you  "urinate (use the toilet), need to go more often and more urgently.  The bag of arias (rupture of membranes) breaks, or you notice leaking in your underwear.  Bright red blood in your underwear.  Abdominal (lower belly) or stomach pain.  Second (plus) baby: Contractions (tightening) less than 10 minutes apart and getting stronger.    Increase or change in vaginal discharge (note the color and amount)    Follow-up:  As scheduled in the clinic          Pending Results     No orders found from 2017 to 2017.            Statement of Approval     Ordered          17 2154  I have reviewed and agree with all the recommendations and orders detailed in this document.  EFFECTIVE NOW     Approved and electronically signed by:  Artem Tiwari APRN CNM             Admission Information     Date & Time Provider Department Dept. Phone    2017 Artem Tiwari APRN CNM [facility] [phone]      Your Vitals Were     Blood Pressure Pulse Temperature Respirations Height Weight    129/77 94 97.8  F (36.6  C) (Oral) 18 1.575 m (5' 2\") 97.1 kg (214 lb)    BMI (Body Mass Index)                   39.14 kg/m2           MyChart Information     [employer] lets you send messages to your doctor, view your test results, renew your prescriptions, schedule appointments and more. To sign up, go to [email or web address] . Click on \"Log in\" on the left side of the screen, which will take you to the Welcome page. Then click on \"Sign up Now\" on the right side of the page.     You will be asked to enter the access code listed below, as well as some personal information. Please follow the directions to create your username and password.     Your access code is: [email or web address]  Expires: 2018  2:14 PM     Your access code will  in 90 days. If you need help or a new code, please call your [place] clinic or [phone].        Care EveryWhere ID     This is your Care EveryWhere ID. This could be used by other organizations to " access your [facility] medical records  [id number]        Equal Access to Services     MARTIN VELEZ : Hadii aad ku hadstefsandro Soelizabeth, walenda riya, qajankita normaabramchrista sosatenzinchrista, treva grandalaureenalireza mazariegos. So [facility] [phone].    ATENCIÓN: Si habla español, tiene a reece disposición servicios gratuitos de asistencia lingüística. Llame al [phone].    We comply with applicable federal civil rights laws and [place] laws. We do not discriminate on the basis of race, color, national origin, age, disability, sex, sexual orientation, or gender identity.               Review of your medicines      CONTINUE these medicines which have NOT CHANGED        Dose / Directions    miconazole 2 % cream   Commonly known as:  CVS MICONAZOLE 7   Used for:  Yeast infection of the vagina        Dose:  1 applicator   Place 1 applicator vaginally At Bedtime   Quantity:  45 g   Refills:  1       valACYclovir 500 MG tablet   Commonly known as:  VALTREX   Used for:  HSV (herpes simplex virus) infection, Normal pregnancy in multigravida        Dose:  500 mg   Take 1 tablet (500 mg) by mouth 2 times daily   Quantity:  60 tablet   Refills:  1                Protect others around you: Learn how to safely use, store and throw away your medicines at www.disposemymeds.org.             Medication List: This is a list of all your medications and when to take them. Check marks below indicate your daily home schedule. Keep this list as a reference.      Medications           Morning Afternoon Evening Bedtime As Needed    miconazole 2 % cream   Commonly known as:  CVS MICONAZOLE 7   Place 1 applicator vaginally At Bedtime                                valACYclovir 500 MG tablet   Commonly known as:  VALTREX   Take 1 tablet (500 mg) by mouth 2 times daily

## 2017-12-24 ENCOUNTER — HOSPITAL ENCOUNTER (INPATIENT)
Facility: CLINIC | Age: 22
LOS: 2 days | Discharge: HOME HEALTH | End: 2017-12-27
Attending: ADVANCED PRACTICE MIDWIFE | Admitting: ADVANCED PRACTICE MIDWIFE
Payer: COMMERCIAL

## 2017-12-24 LAB — A1 MICROGLOB PLACENTAL VAG QL: NEGATIVE

## 2017-12-24 PROCEDURE — 84112 EVAL AMNIOTIC FLUID PROTEIN: CPT | Performed by: ADVANCED PRACTICE MIDWIFE

## 2017-12-24 PROCEDURE — 99215 OFFICE O/P EST HI 40 MIN: CPT

## 2017-12-24 RX ORDER — ONDANSETRON 2 MG/ML
4 INJECTION INTRAMUSCULAR; INTRAVENOUS EVERY 6 HOURS PRN
Status: DISCONTINUED | OUTPATIENT
Start: 2017-12-24 | End: 2017-12-25

## 2017-12-24 NOTE — DISCHARGE INSTRUCTIONS
Discharge Instruction for Undelivered Patients      You were seen for: Labor Assessment  We Consulted: Artem Tiwari CNM  You had (Test or Medicine): Fetal Monitoring and Cervical Exam     Diet:   Drink 8 to 12 glasses of liquids (milk, juice, water) every day.     Activity:  Call your doctor or nurse midwife if your baby is moving less than usual.     Call your provider if you notice:  Swelling in your face or increased swelling in your hands or legs.  Headaches that are not relieved by Tylenol (acetaminophen).  Changes in your vision (blurring: seeing spots or stars.)  Nausea (sick to your stomach) and vomiting (throwing up).   Weight gain of 5 pounds or more per week.  Heartburn that doesn't go away.  Signs of bladder infection: pain when you urinate (use the toilet), need to go more often and more urgently.  The bag of arias (rupture of membranes) breaks, or you notice leaking in your underwear.  Bright red blood in your underwear.  Abdominal (lower belly) or stomach pain.  Second (plus) baby: Contractions (tightening) less than 10 minutes apart and getting stronger.    Increase or change in vaginal discharge (note the color and amount)    Follow-up:  As scheduled in the clinic

## 2017-12-24 NOTE — PROVIDER NOTIFICATION
12/23/17 2146   Provider Notification   Provider Name/Title COLLEEN Tiwari   Method of Notification At Bedside     S[facility] Closed. Plan to discharge pt to home. Pt agreeable with plan.

## 2017-12-24 NOTE — PROGRESS NOTES
CHIEF COMPLAINT  ========================  Ny Carson is a 22 year old patient presenting today at 39w0d for evaluation of uterine contractions.  She is here today with her mother and sisters who she does live with.     No LMP recorded. Patient is pregnant.  Estimated Date of Delivery: Estimated Date of Delivery: Dec 30, 2017     HPI  ==================   Started to have ctx 3 hrs ago that were painful and in her back.    CONTRACTIONS: uterine irritability and 2 ctx in 40 minutes  ABDOMINAL PAIN: none  FETAL MOVEMENT: active  VAGINAL BLEEDING: none  RUPTURE OF MEMBRANES: no  PELVIC PAIN: none  OTHER:     REVIEW OF SYSTEMS  =====================  C: NEGATIVE for fever, chills  I: NEGATIVE for worrisome rashes, moles or lesions  E: NEGATIVE for vision changes or irritation  R: NEGATIVE for significant cough or SOB  CV: NEGATIVE for chest pain, palpitations or varicosities  GI: NEGATIVE for nausea, abdominal pain, heartburn, or change in bowel habits  : NEGATIVE for frequency, dysuria, or hematuria  M: NEGATIVE for significant arthralgias or myalgia  N: NEGATIVE for headache, weakness, dizziness or paresthesias  P: NEGATIVE for changes in mood or affect  HISTORIES  ==============  ALLERGIES:  No Known Allergies  PAST MEDICAL HISTORY  History reviewed. No pertinent past medical history.  FAMILY HISTORY  Family History   Problem Relation Age of Onset     DIABETES Mother      Hypotension Mother      Asthma Father      Asthma Sister      Asthma Brother      DIABETES Maternal Grandfather      Myocardial Infarction Maternal Grandfather      OB HISTORY  Obstetric History       T0      L1     SAB0   TAB0   Ectopic0   Multiple0   Live Births1       # Outcome Date GA Lbr Avelino/2nd Weight Sex Delivery Anes PTL Lv   2 Current            1 Para 05/01/15 40w5d  2.948 kg (6 lb 8 oz) M Vag-Spont None N AGUILAR      Name: Kristine          EXAM  ============  Vital signs stable, afebrile and BP is   BP Readings from  Last 3 Encounters:   12/23/17 129/77   12/20/17 112/60   12/13/17 112/60     GENERAL APPEARANCE: healthy, alert and no distress  RESP: lungs clear to auscultation - no rales, rhonchi or wheezes  BREAST: normal without masses, tenderness or nipple discharge and no palpable axillary masses or adenopathy  CV: regular rates and rhythm, normal S1 S2, no S3 or S4 and no murmur,and no varicosities  ABDOMEN:  soft, nontender,non-tender between contractions no epigastric pain  NEURO: Denies headache, blurred vision  PSYCH: mentation appears normal. and affect normal/bright  LEGS: Reflexes normal bilaterally  CONTRACTIONS:  EVERY 15 MINUTES  mild  FETAL HEART TONES:  140 baseline FHT's with moderate variability, accelerations-yes, variable or late decels none.  Category I FHR monitor tracing.  NST: REACTIVE  CST: NOT DONE  EFW:7 #, 3 oz.  PELVIC EXAM: closed/ 50 % / -3/Posterior/ soft  PRESENTATION: VERTEX  BLOOD: no  DISCHARGE: clear  STERILE SPEC EXAM- NOT DONE  ROM: No    AMNISURE not done    POOLING: not done  FLUID: clear and none  LABS:  None    DIAGNOSIS  ===========  39w0d, Ctx that are q 15.  HSV on prophylactics.       PLAN  ===========  Home with labor instructions per discharge instruction form.  RTC as schduled at [facility] on 12/27/17.

## 2017-12-24 NOTE — IP AVS SNAPSHOT
[facility]    [address] [address]    Phone:  [phone]                                       After Visit Summary   12/24/2017    Ny Feliciano Carson    MRN: [id number]           After Visit Summary Signature Page     I have received my discharge instructions, and my questions have been answered. I have discussed any challenges I see with this plan with the nurse or doctor.    ..........................................................................................................................................  Patient/Patient Representative Signature      ..........................................................................................................................................  Patient Representative Print Name and Relationship to Patient    ..................................................               ................................................  Date                                            Time    ..........................................................................................................................................  Reviewed by Signature/Title    ...................................................              ..............................................  Date                                                            Time

## 2017-12-24 NOTE — IP AVS SNAPSHOT
MRN:[id number]                      After Visit Summary   12/24/2017    Ny Carson    MRN: [id number]           Thank you!     Thank you for choosing [facility] for your care. Our goal is always to provide you with excellent care. Hearing back from our patients is one way we can continue to improve our services. Please take a few minutes to complete the written survey that you may receive in the mail after you visit with us. Thank you!        Patient Information     Date Of Birth          1995        Designated Caregiver       Most Recent Value    Caregiver    Will someone help with your care after discharge? no      About your hospital stay     You were admitted on:  December 24, 2017 You last received care in the:  [facility]    You were discharged on:  December 27, 2017       Who to Call     For medical emergencies, please call 911.  For non-urgent questions about your medical care, please call your primary care provider or clinic, None          Attending Provider     Provider Specialty    Rae Mendez APRN CNM [facility]       Primary Care Provider Fax #    Physician No Ref-Primary [phone]      Your next 10 appointments already scheduled     Feb 06, 2018 12:30 PM CST   Post Partum with CLAUDE Swanson CNM   [facility]    [address] [place] [id number] [phone]              Further instructions from your care team       Postpartum Vaginal Delivery Instructions    Activity       Ask family and friends for help when you need it.    Do not place anything in your vagina for 6 weeks.    You are not restricted on other activities, but take it easy for a few weeks to allow your body to recover from delivery.  You are able to do any activities you feel up to that point.    No driving until you have stopped taking your pain medications (usually two weeks after delivery).     Call your health care  provider if you have any of these symptoms:       Increased pain, swelling, redness, or fluid around your stiches from an episiotomy or perineal tear.    A fever above 100.4 F (38 C) with or without chills when placing a thermometer under your tongue.    You soak a sanitary pad with blood within 1 hour, or you see blood clots larger than a golf ball.    Bleeding that lasts more than 6 weeks.    Vaginal discharge that smells bad.    Severe pain, cramping or tenderness in your lower belly area.    A need to urinate more frequently (use the toilet more often), more urgently (use the toilet very quickly), or it burns when you urinate.    Nausea and vomiting.    Redness, swelling or pain around a vein in your leg.    Problems breastfeeding or a red or painful area on your breast.    Chest pain and cough or are gasping for air.    Problems coping with sadness, anxiety, or depression.  If you have any concerns about hurting yourself or the baby, call your provider immediately.     You have questions or concerns after you return home.     Keep your hands clean:  Always wash your hands before touching your perineal area and stitches.  This helps reduce your risk of infection.  If your hands aren't dirty, you may use an alcohol hand-rub to clean your hands. Keep your nails clean and short.        Pending Results     No orders found from 12/22/2017 to 12/25/2017.            Statement of Approval     Ordered          12/27/17 1017  I have reviewed and agree with all the recommendations and orders detailed in this document.  EFFECTIVE NOW     Approved and electronically signed by:  Audrey Parker APRN CNM             Admission Information     Date & Time Provider Department Dept. Phone    12/24/2017 Rae Mendez APRN CNM [facility] [phone]      Your Vitals Were     Blood Pressure Pulse Temperature Respirations          142/54 70 98.3  F (36.8  C) (Oral) 16        MyChart Information     [facility] lets you send  "messages to your doctor, view your test results, renew your prescriptions, schedule appointments and more. To sign up, go to [email or web address] . Click on \"Log in\" on the left side of the screen, which will take you to the Welcome page. Then click on \"Sign up Now\" on the right side of the page.     You will be asked to enter the access code listed below, as well as some personal information. Please follow the directions to create your username and password.     Your access code is: [email or web address]  Expires: 2018  2:14 PM     Your access code will  in 90 days. If you need help or a new code, please call your [facility] clinic or [phone].        Care EveryWhere ID     This is your Care EveryWhere ID. This could be used by other organizations to access your [facility] medical records  [id number]        Equal Access to Services     MARTIN VELEZ : Marielle Lyons, wajean marie ritter, desiree greenalmachrista fierro, treva mazariegos. So [facility] [phone].    ATENCIÓN: Si habla español, tiene a reece disposición servicios gratuitos de asistencia lingüística. Lazaraleo al [phone].    We comply with applicable federal civil rights laws and [place] laws. We do not discriminate on the basis of race, color, national origin, age, disability, sex, sexual orientation, or gender identity.               Review of your medicines      START taking        Dose / Directions    acetaminophen 325 MG tablet   Commonly known as:  TYLENOL   Used for:   (normal spontaneous vaginal delivery)        Dose:  650 mg   Take 2 tablets (650 mg) by mouth every 4 hours as needed for mild pain or fever (greater than or equal to 38? C /100.4? F (oral) or 38.5? C/ 101.4? F (core).)   Quantity:  100 tablet   Refills:  0       docusate sodium 100 MG tablet   Commonly known as:  COLACE   Used for:   (normal spontaneous vaginal delivery)        Dose:  100 mg   Take 100 mg by mouth daily   Quantity:  30 " tablet   Refills:  0       ibuprofen 800 MG tablet   Commonly known as:  ADVIL/MOTRIN   Used for:   (normal spontaneous vaginal delivery)        Dose:  800 mg   Take 1 tablet (800 mg) by mouth every 6 hours as needed for other (cramping)   Quantity:  90 tablet   Refills:  0       MYNATE 90 PLUS Tbcr   Used for:   (normal spontaneous vaginal delivery)        Dose:  1 tablet   Take 1 tablet by mouth daily   Quantity:  100 tablet   Refills:  0       senna-docusate 8.6-50 MG per tablet   Commonly known as:  SENOKOT-S;PERICOLACE   Used for:   (normal spontaneous vaginal delivery)        Dose:  1 tablet   Take 1 tablet by mouth 2 times daily as needed for constipation   Quantity:  100 tablet   Refills:  0         STOP taking     miconazole 2 % cream   Commonly known as:  [facility] MICONAZOLE 7           valACYclovir 500 MG tablet   Commonly known as:  VALTREX                Where to get your medicines      These medications were sent to [facility] [place], [place] - [address]  [address] [address]     Phone:  [phone]     docusate sodium 100 MG tablet    ibuprofen 800 MG tablet    MYNATE 90 PLUS Tbcr    senna-docusate 8.6-50 MG per tablet         Some of these will need a paper prescription and others can be bought over the counter. Ask your nurse if you have questions.     Bring a paper prescription for each of these medications     acetaminophen 325 MG tablet                Protect others around you: Learn how to safely use, store and throw away your medicines at www.disposemymeds.org.             Medication List: This is a list of all your medications and when to take them. Check marks below indicate your daily home schedule. Keep this list as a reference.      Medications           Morning Afternoon Evening Bedtime As Needed    acetaminophen 325 MG tablet   Commonly known as:  TYLENOL   Take 2 tablets (650 mg) by mouth every 4 hours as needed for mild pain or  fever (greater than or equal to 38? C /100.4? F (oral) or 38.5? C/ 101.4? F (core).)   Last time this was given:  650 mg on 12/27/2017  5:25 AM                                docusate sodium 100 MG tablet   Commonly known as:  COLACE   Take 100 mg by mouth daily                                ibuprofen 800 MG tablet   Commonly known as:  ADVIL/MOTRIN   Take 1 tablet (800 mg) by mouth every 6 hours as needed for other (cramping)   Last time this was given:  800 mg on 12/27/2017  5:25 AM                                MYNATE 90 PLUS Tbcr   Take 1 tablet by mouth daily                                senna-docusate 8.6-50 MG per tablet   Commonly known as:  SENOKOT-S;PERICOLACE   Take 1 tablet by mouth 2 times daily as needed for constipation   Last time this was given:  2 tablets on 12/26/2017  7:38 PM

## 2017-12-24 NOTE — PROGRESS NOTES
Data: Patient presented to the Birthplace at 2105.   Reason for maternal/fetal assessment per patient is Rule Out Labor  . Patient is a . Prenatal record reviewed.      Obstetric History       T0      L1     SAB0   TAB0   Ectopic0   Multiple0   Live Births1       # Outcome Date GA Lbr Avelino/2nd Weight Sex Delivery Anes PTL Lv   2 Current            1 Para 05/01/15 40w5d  2.948 kg (6 lb 8 oz) M Vag-Spont None N AGUILAR      Name: Kristine         Medical History: History reviewed. No pertinent past medical history.. Gestational Age 39w0d. VSS. Cervix: closed.  Fetal movement present. Patient denies vaginal discharge, pelvic pressure, UTI symptoms, GI problems, bloody show, vaginal bleeding, edema, headache, visual disturbances, epigastric or URQ pain, abdominal pain, rupture of membranes. C/o cramping and backache since 183. Support person present.  Action: Verbal consent for EFM. Triage assessment completed. EFM applied for fetal well-being. Uterine assessment done, x1 contraction on monitor. Fetal assessment: Presumed adequate fetal oxygenation documented (see flow record). Patient education pamphlets given on fetal movement counts and when to call provider. Patient instructed to report change in fetal movement, vaginal leaking of fluid or bleeding, abdominal pain, or any concerns related to the pregnancy to her nurse/physician.   Response: Artem Tiwari informed of pt arrival. Cervix closed. Plan per provider is discharge pt to home. Patient verbalized understanding of education and verbalized agreement with plan. Discharged ambulatory at 2200.

## 2017-12-25 LAB
ABO + RH BLD: NORMAL
ABO + RH BLD: NORMAL
BLD GP AB SCN SERPL QL: NORMAL
BLOOD BANK CMNT PATIENT-IMP: NORMAL
ERYTHROCYTE [DISTWIDTH] IN BLOOD BY AUTOMATED COUNT: 16.2 % (ref 10–15)
HCT VFR BLD AUTO: 37.5 % (ref 35–47)
HGB BLD-MCNC: 12.1 G/DL (ref 11.7–15.7)
MCH RBC QN AUTO: 26.9 PG (ref 26.5–33)
MCHC RBC AUTO-ENTMCNC: 32.3 G/DL (ref 31.5–36.5)
MCV RBC AUTO: 83 FL (ref 78–100)
PLATELET # BLD AUTO: 268 10E9/L (ref 150–450)
RBC # BLD AUTO: 4.5 10E12/L (ref 3.8–5.2)
SPECIMEN EXP DATE BLD: NORMAL
WBC # BLD AUTO: 14.7 10E9/L (ref 4–11)

## 2017-12-25 PROCEDURE — 72200001 ZZH LABOR CARE VAGINAL DELIVERY SINGLE

## 2017-12-25 PROCEDURE — 86780 TREPONEMA PALLIDUM: CPT | Performed by: ADVANCED PRACTICE MIDWIFE

## 2017-12-25 PROCEDURE — 85027 COMPLETE CBC AUTOMATED: CPT | Performed by: ADVANCED PRACTICE MIDWIFE

## 2017-12-25 PROCEDURE — 12000028 ZZH R&B OB UMMC

## 2017-12-25 PROCEDURE — 25000128 H RX IP 250 OP 636

## 2017-12-25 PROCEDURE — 86850 RBC ANTIBODY SCREEN: CPT | Performed by: ADVANCED PRACTICE MIDWIFE

## 2017-12-25 PROCEDURE — 25000128 H RX IP 250 OP 636: Performed by: ADVANCED PRACTICE MIDWIFE

## 2017-12-25 PROCEDURE — 59410 OBSTETRICAL CARE: CPT | Performed by: ADVANCED PRACTICE MIDWIFE

## 2017-12-25 PROCEDURE — 86900 BLOOD TYPING SEROLOGIC ABO: CPT | Performed by: ADVANCED PRACTICE MIDWIFE

## 2017-12-25 PROCEDURE — 86901 BLOOD TYPING SEROLOGIC RH(D): CPT | Performed by: ADVANCED PRACTICE MIDWIFE

## 2017-12-25 PROCEDURE — 36415 COLL VENOUS BLD VENIPUNCTURE: CPT | Performed by: ADVANCED PRACTICE MIDWIFE

## 2017-12-25 PROCEDURE — 25000132 ZZH RX MED GY IP 250 OP 250 PS 637: Performed by: ADVANCED PRACTICE MIDWIFE

## 2017-12-25 RX ORDER — BISACODYL 10 MG
10 SUPPOSITORY, RECTAL RECTAL DAILY PRN
Status: DISCONTINUED | OUTPATIENT
Start: 2017-12-27 | End: 2017-12-27 | Stop reason: HOSPADM

## 2017-12-25 RX ORDER — AMOXICILLIN 250 MG
2 CAPSULE ORAL 2 TIMES DAILY PRN
Status: DISCONTINUED | OUTPATIENT
Start: 2017-12-25 | End: 2017-12-27 | Stop reason: HOSPADM

## 2017-12-25 RX ORDER — IBUPROFEN 800 MG/1
800 TABLET, FILM COATED ORAL EVERY 6 HOURS PRN
Status: DISCONTINUED | OUTPATIENT
Start: 2017-12-25 | End: 2017-12-27 | Stop reason: HOSPADM

## 2017-12-25 RX ORDER — ACETAMINOPHEN 325 MG/1
650 TABLET ORAL EVERY 4 HOURS PRN
Qty: 100 TABLET | Refills: 0 | Status: SHIPPED | OUTPATIENT
Start: 2017-12-25

## 2017-12-25 RX ORDER — OXYTOCIN 10 [USP'U]/ML
INJECTION, SOLUTION INTRAMUSCULAR; INTRAVENOUS
Status: DISCONTINUED
Start: 2017-12-25 | End: 2017-12-25 | Stop reason: HOSPADM

## 2017-12-25 RX ORDER — IBUPROFEN 800 MG/1
800 TABLET, FILM COATED ORAL
Status: DISCONTINUED | OUTPATIENT
Start: 2017-12-25 | End: 2017-12-25

## 2017-12-25 RX ORDER — OXYTOCIN 10 [USP'U]/ML
10 INJECTION, SOLUTION INTRAMUSCULAR; INTRAVENOUS
Status: DISCONTINUED | OUTPATIENT
Start: 2017-12-25 | End: 2017-12-25

## 2017-12-25 RX ORDER — ACETAMINOPHEN 325 MG/1
650 TABLET ORAL EVERY 4 HOURS PRN
Status: DISCONTINUED | OUTPATIENT
Start: 2017-12-25 | End: 2017-12-27 | Stop reason: HOSPADM

## 2017-12-25 RX ORDER — OXYTOCIN/0.9 % SODIUM CHLORIDE 30/500 ML
PLASTIC BAG, INJECTION (ML) INTRAVENOUS
Status: DISCONTINUED
Start: 2017-12-25 | End: 2017-12-25 | Stop reason: HOSPADM

## 2017-12-25 RX ORDER — OXYCODONE HYDROCHLORIDE 5 MG/1
10 TABLET ORAL EVERY 4 HOURS PRN
Status: DISCONTINUED | OUTPATIENT
Start: 2017-12-25 | End: 2017-12-27 | Stop reason: HOSPADM

## 2017-12-25 RX ORDER — LIDOCAINE HYDROCHLORIDE 10 MG/ML
INJECTION, SOLUTION EPIDURAL; INFILTRATION; INTRACAUDAL; PERINEURAL
Status: DISCONTINUED
Start: 2017-12-25 | End: 2017-12-25 | Stop reason: HOSPADM

## 2017-12-25 RX ORDER — SODIUM CHLORIDE, SODIUM LACTATE, POTASSIUM CHLORIDE, CALCIUM CHLORIDE 600; 310; 30; 20 MG/100ML; MG/100ML; MG/100ML; MG/100ML
INJECTION, SOLUTION INTRAVENOUS CONTINUOUS
Status: DISCONTINUED | OUTPATIENT
Start: 2017-12-25 | End: 2017-12-25

## 2017-12-25 RX ORDER — ACETAMINOPHEN 325 MG/1
650 TABLET ORAL EVERY 4 HOURS PRN
Status: DISCONTINUED | OUTPATIENT
Start: 2017-12-25 | End: 2017-12-25

## 2017-12-25 RX ORDER — OXYTOCIN 10 [USP'U]/ML
10 INJECTION, SOLUTION INTRAMUSCULAR; INTRAVENOUS
Status: DISCONTINUED | OUTPATIENT
Start: 2017-12-25 | End: 2017-12-27 | Stop reason: HOSPADM

## 2017-12-25 RX ORDER — LANOLIN 100 %
OINTMENT (GRAM) TOPICAL
Status: DISCONTINUED | OUTPATIENT
Start: 2017-12-25 | End: 2017-12-27 | Stop reason: HOSPADM

## 2017-12-25 RX ORDER — MISOPROSTOL 200 UG/1
400 TABLET ORAL
Status: DISCONTINUED | OUTPATIENT
Start: 2017-12-25 | End: 2017-12-27 | Stop reason: HOSPADM

## 2017-12-25 RX ORDER — NALOXONE HYDROCHLORIDE 0.4 MG/ML
.1-.4 INJECTION, SOLUTION INTRAMUSCULAR; INTRAVENOUS; SUBCUTANEOUS
Status: DISCONTINUED | OUTPATIENT
Start: 2017-12-25 | End: 2017-12-25

## 2017-12-25 RX ORDER — OXYTOCIN/0.9 % SODIUM CHLORIDE 30/500 ML
340 PLASTIC BAG, INJECTION (ML) INTRAVENOUS CONTINUOUS PRN
Status: DISCONTINUED | OUTPATIENT
Start: 2017-12-25 | End: 2017-12-27 | Stop reason: HOSPADM

## 2017-12-25 RX ORDER — MISOPROSTOL 200 UG/1
TABLET ORAL
Status: DISCONTINUED
Start: 2017-12-25 | End: 2017-12-25 | Stop reason: HOSPADM

## 2017-12-25 RX ORDER — SODIUM CHLORIDE, SODIUM LACTATE, POTASSIUM CHLORIDE, CALCIUM CHLORIDE 600; 310; 30; 20 MG/100ML; MG/100ML; MG/100ML; MG/100ML
INJECTION, SOLUTION INTRAVENOUS
Status: COMPLETED
Start: 2017-12-25 | End: 2017-12-25

## 2017-12-25 RX ORDER — NALOXONE HYDROCHLORIDE 0.4 MG/ML
.1-.4 INJECTION, SOLUTION INTRAMUSCULAR; INTRAVENOUS; SUBCUTANEOUS
Status: DISCONTINUED | OUTPATIENT
Start: 2017-12-25 | End: 2017-12-27 | Stop reason: HOSPADM

## 2017-12-25 RX ORDER — HYDROCORTISONE 2.5 %
CREAM (GRAM) TOPICAL 3 TIMES DAILY PRN
Status: DISCONTINUED | OUTPATIENT
Start: 2017-12-25 | End: 2017-12-27 | Stop reason: HOSPADM

## 2017-12-25 RX ORDER — AMOXICILLIN 250 MG
1 CAPSULE ORAL 2 TIMES DAILY PRN
Qty: 100 TABLET | Refills: 0 | Status: SHIPPED | OUTPATIENT
Start: 2017-12-25

## 2017-12-25 RX ORDER — CARBOPROST TROMETHAMINE 250 UG/ML
250 INJECTION, SOLUTION INTRAMUSCULAR
Status: DISCONTINUED | OUTPATIENT
Start: 2017-12-25 | End: 2017-12-25

## 2017-12-25 RX ORDER — AMOXICILLIN 250 MG
1 CAPSULE ORAL 2 TIMES DAILY PRN
Status: DISCONTINUED | OUTPATIENT
Start: 2017-12-25 | End: 2017-12-27 | Stop reason: HOSPADM

## 2017-12-25 RX ORDER — FENTANYL CITRATE 50 UG/ML
50-100 INJECTION, SOLUTION INTRAMUSCULAR; INTRAVENOUS
Status: DISCONTINUED | OUTPATIENT
Start: 2017-12-25 | End: 2017-12-25

## 2017-12-25 RX ORDER — METHYLERGONOVINE MALEATE 0.2 MG/ML
200 INJECTION INTRAVENOUS
Status: DISCONTINUED | OUTPATIENT
Start: 2017-12-25 | End: 2017-12-25

## 2017-12-25 RX ORDER — OXYCODONE AND ACETAMINOPHEN 5; 325 MG/1; MG/1
1 TABLET ORAL
Status: DISCONTINUED | OUTPATIENT
Start: 2017-12-25 | End: 2017-12-25

## 2017-12-25 RX ORDER — IBUPROFEN 800 MG/1
800 TABLET, FILM COATED ORAL EVERY 6 HOURS PRN
Qty: 90 TABLET | Refills: 0 | Status: SHIPPED | OUTPATIENT
Start: 2017-12-25

## 2017-12-25 RX ORDER — OXYTOCIN/0.9 % SODIUM CHLORIDE 30/500 ML
100-340 PLASTIC BAG, INJECTION (ML) INTRAVENOUS CONTINUOUS PRN
Status: DISCONTINUED | OUTPATIENT
Start: 2017-12-25 | End: 2017-12-25

## 2017-12-25 RX ORDER — OXYTOCIN/0.9 % SODIUM CHLORIDE 30/500 ML
100 PLASTIC BAG, INJECTION (ML) INTRAVENOUS CONTINUOUS
Status: DISCONTINUED | OUTPATIENT
Start: 2017-12-25 | End: 2017-12-27 | Stop reason: HOSPADM

## 2017-12-25 RX ORDER — PENICILLIN G POTASSIUM 5000000 [IU]/1
5 INJECTION, POWDER, FOR SOLUTION INTRAMUSCULAR; INTRAVENOUS ONCE
Status: COMPLETED | OUTPATIENT
Start: 2017-12-25 | End: 2017-12-25

## 2017-12-25 RX ORDER — ONDANSETRON 2 MG/ML
4 INJECTION INTRAMUSCULAR; INTRAVENOUS EVERY 6 HOURS PRN
Status: DISCONTINUED | OUTPATIENT
Start: 2017-12-25 | End: 2017-12-25

## 2017-12-25 RX ADMIN — IBUPROFEN 800 MG: 800 TABLET ORAL at 21:56

## 2017-12-25 RX ADMIN — IBUPROFEN 800 MG: 800 TABLET ORAL at 04:05

## 2017-12-25 RX ADMIN — SODIUM CHLORIDE, POTASSIUM CHLORIDE, SODIUM LACTATE AND CALCIUM CHLORIDE: 600; 310; 30; 20 INJECTION, SOLUTION INTRAVENOUS at 02:00

## 2017-12-25 RX ADMIN — IBUPROFEN 800 MG: 800 TABLET ORAL at 13:09

## 2017-12-25 RX ADMIN — ACETAMINOPHEN 650 MG: 325 TABLET, FILM COATED ORAL at 17:11

## 2017-12-25 RX ADMIN — ACETAMINOPHEN 650 MG: 325 TABLET, FILM COATED ORAL at 23:39

## 2017-12-25 RX ADMIN — SODIUM CHLORIDE, SODIUM LACTATE, POTASSIUM CHLORIDE, CALCIUM CHLORIDE: 600; 310; 30; 20 INJECTION, SOLUTION INTRAVENOUS at 02:00

## 2017-12-25 RX ADMIN — PENICILLIN G POTASSIUM 5 MILLION UNITS: 5000000 POWDER, FOR SOLUTION INTRAMUSCULAR; INTRAPLEURAL; INTRATHECAL; INTRAVENOUS at 01:41

## 2017-12-25 NOTE — PLAN OF CARE
Data: Ny Carson transferred to 7144 via wheelchair at 0600. Baby transferred via parent's arms.  Action: Receiving unit notified of transfer: Yes. Patient and family notified of room change. Report given to Eun at 0605. Belongings sent to receiving unit. Accompanied by Registered Nurse. Oriented patient to surroundings. Call light within reach. ID bands double-checked with receiving RN.  Response: Patient tolerated transfer and is stable.

## 2017-12-25 NOTE — H&P
ADMIT NOTE  =================  39w2d  Ny Carson is a 22 year old female     with an No LMP recorded. Patient is pregnant. and Estimated Date of Delivery: Dec 30, 2017 is admitted to the [facility] on 2017 at 1:07 AM in early labor.   Fetal movement- active  ROM- no   GBS- positive    HPI  ================  Patient arrived to triage around 2300 for evaluation of labor. Her cervix was closed in triage yesterday and on arrival she was 3cm but contractions were q7-12 minutes. The RNs used the rebozo and had patient walk in the halls and then I was paged at 0030 and notified that there was cervical change from 3 to 4cm dilation, 70 to 90% effacement, and -3 to -2 station. Contractions are now every 3-5 and pt was getting more uncomfortable. Admitted at this time.   FOB- is not present; here with mom and sisters    PRENATAL COURSE  =================  Prenatal course was   complicated by    Patient Active Problem List    Diagnosis Date Noted     Labor and delivery, indication for care 2017     Priority: Medium     Labor and delivery indication for care or intervention 2017     Priority: Medium     HSV (herpes simplex virus) infection 2017     Priority: Medium     Pt states at 36 weeks needs prophyaxis, will give prescription.         Group B streptococcal infection during pregnancy 2017     Priority: Medium     Rx in labor.         Normal pregnancy in multigravida 2017     Priority: Medium     Transfer from [facility].   Started at 8 weeks gestation  Number of prenatal visits:    17 U/S 8 4/7 weeks;  YAW; 17 U/S 21 3/7 weeks, nl fetal survey, posterior placenta.  17 1 hour GCT: 107       Need for Tdap vaccination 2017     Priority: Medium     Given 17       Susceptible to varicella (non-immune), currently pregnant 2017     Priority: Medium     Vitamin D deficiency 2017     Priority: Medium     5/10/17  Vitamin D level : 11  low.          HISTORIES  ============  No Known Allergies  History reviewed. No pertinent past medical history.  Past Surgical History:   Procedure Laterality Date     TONSILLECTOMY & ADENOIDECTOMY      age 6   .  Family History   Problem Relation Age of Onset     DIABETES Mother      Hypotension Mother      Asthma Father      Asthma Sister      Asthma Brother      DIABETES Maternal Grandfather      Myocardial Infarction Maternal Grandfather      Social History   Substance Use Topics     Smoking status: Former Smoker     Types: Cigarettes     Quit date: 2017     Smokeless tobacco: Never Used     Alcohol use No     Obstetric History       T0      L1     SAB0   TAB0   Ectopic0   Multiple0   Live Births1       # Outcome Date GA Lbr Avelino/2nd Weight Sex Delivery Anes PTL Lv   2 Current            1 Para 05/01/15 40w5d  2.948 kg (6 lb 8 oz) M Vag-Spont None N AGUILAR      Name: Kristine           LABS:   ===========  Rhogam not indicated  Lab Results   Component Value Date    ABO A 05/10/2017       Lab Results   Component Value Date    RH Pos 05/10/2017     No results found for: RUBELLAABIGG   No results found for: RPR  No results found for: HIV  Lab Results   Component Value Date    HGB 10.9 2017      Lab Results   Component Value Date    HEPBANG Neg 05/10/2017     Lab Results   Component Value Date    GBS Positive 2017     other labs- could not find in chart; reports that she did have it done at [facility].    ROS  =========  Pt denies significant respiratory, cardiovacular, GI, or muscular/skeletalcomplaints.    See RN data base ROS.     PHYSICAL EXAM:  ===============  Blood pressure 132/81, temperature 98  F (36.7  C), temperature source Oral, resp. rate 18.  General appearance: uncomfortable with contractions  Heart: RRR without murmur  Lungs: clear to auscultation   Neuro: denies headache and visual disturbances  Psych: Mentation normal and bright   Legs: 2+/2+, no clonus, no edema       Abdomen: gravid, single vertex fetus, non-tender between contractions.  EFW-  7 lbs.     NST reactive  CONTRACTIONS: Contractions every 2-5 minutes.  Palpate: moderate  FETAL HEART TONES: baseline 130 with moderate FHR variability and  pos accelerations. No decelerations present.      PELVIC EXAM: /-2  ZALDIVAR SCORE: 9  BLOODY SHOW: no   ROM: No  FLUID: none  AMNISURE: not done    ASSESSMENT:  ==============  IUP @ 39w2d in early/active labor   NST REACTIVE  Fetal Heart rate tracing category one  GBS- positive     PLAN:  ===========  Admit - see IP orders  Pain medication per patient request - she states she doesn't want any medication but is interested in water birth. I don't have record of GCT and she did not have Hep C drawn in clinic so will not be able to birth in water but will plan to use bathtub in room for hydrotherapy.   Prophylactic antibiotic for + GBS status  Anticipate    Reevaluate in 2-4 hours or sooner rupan  CLAUDE Glass CNM

## 2017-12-25 NOTE — PROGRESS NOTES
S:Patient becoming increasingly more uncomfortable, sat in warm tub for awhile but then c/o feeling like she had to have a BM. Discussed AROM with patient and family specifically in regards to not getting 4 hours of abx but also probable that she won't get the full hours even without AROM given she has 3 more hours until the next dose and seems to be moving quickly. Pt requests AROM now.    O:  Blood pressure 118/76, temperature 98.4  F (36.9  C), temperature source Oral, resp. rate 18.  General appearance: uncomfortable with contractions    CONTRACTIONS: Contractions every 1.5-3 minutes.  Palpate: moderate  FETAL HEART TONES: 140's, EFM reapplied during AROM  ROM: clear fluid  PELVIC EXAM:/  Fetal Position:  vertex  Bloody show: Yes   Pitocin- none,  Antibiotics- PCN, one dose complete    ASSESSMENT:  ==============  IUP @ 39w2d active labor   Fetal Heart rate tracing Category category one  GBS- positive     PLAN:  ===========  Pain medication per patient request  Anticipate   Reevaluate in 2-4 hours/PRN   Rae Mendez CNM

## 2017-12-25 NOTE — L&D DELIVERY NOTE
Delivery Note  Ny felt significant rectal pressure starting at 8cm. We got her up to stand at the side of the bed until she was unable to stop herself from pushing. She got into bed and CNM held anterior rim of cervix over fetal vertex as patient pushed effectively through three contractions to the birth of a baby boy. Nuchal x1 and cord wrapped under right arm and around the body. Initially not crying but had good tone. Low heart rate was noted so cord clamped and cut and baby handed to NICU team who was standing by for decelerations and heart tones in the 70's during second stage. See NICU note for resuscitation, baby brought back to mom.   IUP at 39 weeks 2 days gestation delivered on 17 @ 0346.     delivery of a viable male infant; weight pending.  Apgars of 7 at 1 minute and 9 at 5 minutes.  Labor was spontaneous.  Medications administered  in labor:  Pain Rx none; Antibiotics Yes: one dose of PCN  Perineum: Intact  Placenta-mechanism: spontaneous, intact,  with a 3 vessel cord. IV oxytocin was given.  QBL pending  Complications of pregnancy, labor and delivery: Bradycardia, <4 hours of PCN for GBS+  Birth attendants: Rae Mendez CNM    Delivery Summary    Ny Carson MRN# [id number]   Age: 22 year old YOB: 1995     Labor Event Times:    Labor Onset Date       Labor Onset Time    Dilation Complete Date    Dilation Complete Time       Start Pushing Date        Start Pushing Time            Labor Length:    1st Stage (hrs/min)     2nd Stage (hrs/min)     3rd Stage (hrs/min)         Labor Events:     Labor No   Rupture Date     Rupture Time     Rupture Type Artificial Rupture of Membranes   Fluid Color     Labor Type     Induction    Induction Indication         Augmentation    Labor Complications     Additional Complications     Management of Labor        Antibiotics     IV Antibiotic Given     Additional Management     Fetal Status Prior to  Delivery     Fetal  "Status Comments         Cervical Ripening:    Date     Time     Type         Delivery:    Episiotomy None   Local Anesthetic        Lacerations None   Sponge Count Correct       Needle Count Correct     Final Count by:    Sutures     Blood loss (ml)    Packing Intentionally Left In     Number     Comments           Information for the patient's :  Alex Carson Ny [[id number]]       Delivery  2017 3:46 AM by  Vaginal, Spontaneous Delivery  Sex:  male Gestational Age: 39w2d  Delivery Clinician:     Living?:            APGARS  One minute Five minutes Ten minutes   Skin color:            Heart rate:            Grimace:            Muscle tone:            Breathing:            Totals: 7  9         Presentation/position:           Resuscitation and Interventions: Method:  Suctioning  Oxygen Type:     Intubation Time:   # of Attempts:     ETT Size:        Tracheal Suction:     Tracheal returns:      [place] Care at Delivery:  [facility] Delivery Note    Asked by Dr. Valenzuela to attend the delivery of this term, male infant with a gestational age of 39 2/7 weeks secondary to fetal heart rate deceleration.      Infant delivered at 0346 hours on 2017. Infant had spontaneous res  pirations at birth. After one minute of delayed cord clamping he was placed on a warmer, dried, stimulated, and bulb suctioned at birth. Apgars were 7 at one minute and 9 at five minutes of age. Gross PE is WNL except for molding of the head. Infant   was shown to mother and father and will be transferred to the  family care center for further care.    Nayeli Schmidt RN, [facility]  2017  3:56 AM        Cord information:     Disposition of cord blood:      Blood gases sent?    Complications:     Placenta: Delivered:           appearance.  Comments: intact.  Disposition: Hospital disposal  [place] Measurements:  Weight: 6 lb 11 oz (3033 g)  Height: 20\"  Head circumference: 33.7 cm  Chest circumference:     Temperature:     Other " providers:       Additional  information:  Forceps:    Verbal Informed Consent Obtained:       Alternative Labor Strategies Discussed:     Emergency Resources Available:       Type:       Accrued Pulling Time:       # of Pulls:      Position:     Fetal Station:       Indications:      Other Indications:     Operative Vaginal Delivery Brief Note Forceps:        Vacuum:    Verbal Informed Consent Obtained:     Alternative Labor Strategies Discussed:     Emergency Resources Available:     Type:      Accrued Pulling Time:       # of Pop-Offs:       # of Pulls:       Position:     Fetal Station:      Indications for Vacuum:       Other Indications:    Operative Vaginal Delivery Brief Note Vacuum:        Shoulder Dystocia Shoulder Dystocia    Fetal Tracing Prior to Delivery:  Category 2   Shoulder dystocia present?:  No                                            Breech:       : Type:     Indications for Primary:     Indications for Secondary:     Other Indications:        Observed anomalies     Output in Delivery Room:

## 2017-12-25 NOTE — PLAN OF CARE
Problem: Postpartum (Vaginal Delivery) (Adult,Obstetrics,Pediatric)  Goal: Signs and Symptoms of Listed Potential Problems Will be Absent, Minimized or Managed (Postpartum)  Signs and symptoms of listed potential problems will be absent, minimized or managed by discharge/transition of care (reference Postpartum (Vaginal Delivery) (Adult,Obstetrics,Pediatric) CPG).   Outcome: Improving  Patient arrived to [facility] unit via wheelchair at 0605,with belongings, accompanied by family, with infant in arms. Received report from Aubrey RAMIREZ and checked bands. Unit and room orientation completd. Call light given; no concerns present at this time. Continue with plan of care.

## 2017-12-25 NOTE — PLAN OF CARE
Problem: Patient Care Overview  Goal: Plan of Care/Patient Progress Review  Outcome: No Change  D: Ny had an  of a viable baby boy at 0346, placenta followed at 0351. Birth attended by Rae Mendez CNM.  I: VS monitored per protocol. Fetal monitoring as ordered by GOPIM. Encouraged activity and changing position freqently. PCN for +GBS. Ibuprofen for postpartum pain.  A: VSS. Fundus firm U/U, lochia rubra small/scant. Breastfeeding x1. Pt's mother at the bedside and supportive.  P: Continue cares as ordered.  Notify provider with concerns.

## 2017-12-25 NOTE — PROVIDER NOTIFICATION
12/25/17 0041   Provider Notification   Provider Name/Title JACEY Mendez CNM   Method of Notification Phone   Request Evaluate - Remote   Notification Reason Labor Status   Notified CNM that pt has made some cervical change from 3 to 4cm dilation, 70 to 90% effacement, and -3 to -2 station. Contractions are now every 3-5 and pt is getting more uncomfortable. Pt is GBS+, so will need admission orders and orders for antibiotics for GBS.

## 2017-12-26 LAB
HGB BLD-MCNC: 10.8 G/DL (ref 11.7–15.7)
T PALLIDUM IGG+IGM SER QL: NEGATIVE

## 2017-12-26 PROCEDURE — 12000028 ZZH R&B OB UMMC

## 2017-12-26 PROCEDURE — 85018 HEMOGLOBIN: CPT | Performed by: ADVANCED PRACTICE MIDWIFE

## 2017-12-26 PROCEDURE — 25000132 ZZH RX MED GY IP 250 OP 250 PS 637: Performed by: ADVANCED PRACTICE MIDWIFE

## 2017-12-26 PROCEDURE — 36415 COLL VENOUS BLD VENIPUNCTURE: CPT | Performed by: ADVANCED PRACTICE MIDWIFE

## 2017-12-26 RX ORDER — ASPIRIN 81 MG
100 TABLET, DELAYED RELEASE (ENTERIC COATED) ORAL DAILY
Qty: 30 TABLET | Refills: 0 | Status: SHIPPED | OUTPATIENT
Start: 2017-12-26

## 2017-12-26 RX ADMIN — OXYCODONE HYDROCHLORIDE 10 MG: 5 TABLET ORAL at 10:48

## 2017-12-26 RX ADMIN — SENNOSIDES AND DOCUSATE SODIUM 2 TABLET: 8.6; 5 TABLET ORAL at 19:38

## 2017-12-26 RX ADMIN — SENNOSIDES AND DOCUSATE SODIUM 2 TABLET: 8.6; 5 TABLET ORAL at 10:48

## 2017-12-26 RX ADMIN — IBUPROFEN 800 MG: 800 TABLET ORAL at 16:24

## 2017-12-26 RX ADMIN — ACETAMINOPHEN 650 MG: 325 TABLET, FILM COATED ORAL at 19:38

## 2017-12-26 RX ADMIN — IBUPROFEN 800 MG: 800 TABLET ORAL at 04:56

## 2017-12-26 RX ADMIN — OXYCODONE HYDROCHLORIDE 10 MG: 5 TABLET ORAL at 00:12

## 2017-12-26 RX ADMIN — IBUPROFEN 800 MG: 800 TABLET ORAL at 22:35

## 2017-12-26 NOTE — PLAN OF CARE
Problem: Postpartum (Vaginal Delivery) (Adult,Obstetrics,Pediatric)  Goal: Signs and Symptoms of Listed Potential Problems Will be Absent, Minimized or Managed (Postpartum)  Signs and symptoms of listed potential problems will be absent, minimized or managed by discharge/transition of care (reference Postpartum (Vaginal Delivery) (Adult,Obstetrics,Pediatric) CPG).   Postpartum stable. Ambulating independently. Voiding adequate amounts. Pain is tolerable with po ibuprofen. Breastfeeding attempts are independent and is hand expressing independently. PIV SL. Continue plan of care.

## 2017-12-26 NOTE — PROGRESS NOTES
[facility], [place]    Post-Partum Progress Note    Assessment & Plan   Assessment:  Post-partum day #1  Normal spontaneous vaginal delivery    Doing well.  No excessive bleeding  Pain well-controlled.  Tolerating activity well.  Breastfeeding well  No questions or concerns     Plan:  Ambulation encouraged  Breast feeding strategies discussed  Pain control measures as needed  Reportable signs and symptoms dicussed with the patient  Anticipate discharge tomorrow  Unsure what she wants to use for birth control, does not like pills, depo, or patch. Will not try IUD or nexplanon. Will think about options and follow up at 6 weeks for further discussion.     Brook Bustillos     Interval History   Doing well.  Pain is well-controlled.  No fevers.  No history of foul-smelling vaginal discharge.  Good appetite.  Denies chest pain, shortness of breath, nausea or vomiting.  Vaginal bleeding is similar to a heavy menstrual flow.  Ambulatory.  Breastfeeding well.    Medications     oxytocin in 0.9% NaCl       oxytocin in 0.9% NaCl       NO Rho (D) immune globulin (RhoGam) needed - mother Rh POSITIVE       nitrous oxide/oxygen 50/50 blend       - MEDICATION INSTRUCTIONS -       - MEDICATION INSTRUCTIONS -            Physical Exam   Temp: 97.9  F (36.6  C) Temp src: Oral BP: 106/51 Pulse: 61 Heart Rate: 18 Resp: 16        There were no vitals filed for this visit.  Vital Signs with Ranges  Temp:  [97.9  F (36.6  C)-98.2  F (36.8  C)] 97.9  F (36.6  C)  Pulse:  [61-67] 61  Heart Rate:  [18-74] 18  Resp:  [16] 16  BP: (106-124)/(51-74) 106/51  I/O last 3 completed shifts:  In: 600 [P.O.:600]  Out: -     Uterine fundus is firm, non-tender and at the level of the umbilicus    Data   Recent Labs   Lab Test  12/25/17   0209   ABO  A   RH  Pos   AS  Neg     Recent Labs   Lab Test  12/26/17   0718  12/25/17   0209   HGB  10.8*  12.1     Recent Labs   Lab Test 05/10/17   RUQIGG  Immune     Brook Willoughby  Tressa VICENTE

## 2017-12-26 NOTE — PLAN OF CARE
Problem: Postpartum (Vaginal Delivery) (Adult,Obstetrics,Pediatric)  Goal: Signs and Symptoms of Listed Potential Problems Will be Absent, Minimized or Managed (Postpartum)  Signs and symptoms of listed potential problems will be absent, minimized or managed by discharge/transition of care (reference Postpartum (Vaginal Delivery) (Adult,Obstetrics,Pediatric) CPG).   Outcome: Improving  VSS. cramping well controlled. Intake and output is adequate. Ambulatory. Able to care for self and infant. Breastfeeding independently. Appears  tired due baby's cluster-feeding overnight. Bonding with baby. No concerns at this time, will continue with POC.

## 2017-12-26 NOTE — PLAN OF CARE
Problem: Patient Care Overview  Goal: Plan of Care/Patient Progress Review  Outcome: Improving  [id number]  Data: Vital signs within normal limits. Postpartum checks within normal limits - see flow record. Patient eating and drinking normally. Patient able to empty bladder independently and is up ambulating. No apparent signs of infection. Patient performing self cares and is able to care for infant.  Action: Patient medicated during the shift for uterine cramping. See MAR. Patient reassessed within 1 hour after each medication and pain was improved - patient stated she was comfortable. Patient education done self care, and pain management.    Response: Positive attachment behaviors observed with infant. Support persons present.   Plan: Anticipate discharge on 12/27/2017

## 2017-12-27 ENCOUNTER — LACTATION ENCOUNTER (OUTPATIENT)
Age: 22
End: 2017-12-27

## 2017-12-27 VITALS
RESPIRATION RATE: 16 BRPM | DIASTOLIC BLOOD PRESSURE: 54 MMHG | SYSTOLIC BLOOD PRESSURE: 142 MMHG | TEMPERATURE: 98.3 F | HEART RATE: 70 BPM

## 2017-12-27 PROCEDURE — 25000132 ZZH RX MED GY IP 250 OP 250 PS 637: Performed by: ADVANCED PRACTICE MIDWIFE

## 2017-12-27 RX ADMIN — ACETAMINOPHEN 650 MG: 325 TABLET, FILM COATED ORAL at 05:25

## 2017-12-27 RX ADMIN — ACETAMINOPHEN 650 MG: 325 TABLET, FILM COATED ORAL at 00:04

## 2017-12-27 RX ADMIN — IBUPROFEN 800 MG: 800 TABLET ORAL at 05:25

## 2017-12-27 NOTE — PROGRESS NOTES
Post Partum Note  SIGNIFICANT PROBLEMS:  Patient Active Problem List    Diagnosis Date Noted     Labor and delivery, indication for care 12/24/2017     Priority: Medium     HSV (herpes simplex virus) infection 12/06/2017     Priority: Medium     Pt states at 36 weeks needs prophyaxis, will give prescription.         Group B streptococcal infection during pregnancy 12/01/2017     Priority: Medium     Rx in labor.    One dose at 0141.  Not adequately treated in labor.         Normal pregnancy in multigravida 11/08/2017     Priority: Medium     Transfer from [facility].   Started at 8 weeks gestation  Number of prenatal visits:    5/24/17 U/S 8 4/7 weeks;  YAW; 12/30/17 8/18/17 U/S 21 3/7 weeks, nl fetal survey, posterior placenta.  6/7/17 1 hour GCT: 107       Need for Tdap vaccination 11/08/2017     Priority: Medium     Given 12/6/17       Susceptible to varicella (non-immune), currently pregnant 05/17/2017     Priority: Medium     Vitamin D deficiency 05/11/2017     Priority: Medium     5/10/17  Vitamin D level : 11 low.         INTERVAL HISTORY:  /54  Pulse 70  Temp 98.3  F (36.8  C) (Oral)  Resp 16  Breastfeeding? Unknown  Pt stable, baby is rooming in.   Breast feeding status:initiated  Complications since 2 hours post delivery: None  Patient is tolerating activity well, Voiding without difficulty, cramping is relieved by Ibuprophen, lochia is decreasing and patient denies clots.  Perineal pain is is relieved by Ibuprophen.  The perineum is intact    Postpartum hemoglobin   Hemoglobin   Date Value Ref Range Status   12/26/2017 10.8 (L) 11.7 - 15.7 g/dL Final     Blood type   Lab Results   Component Value Date    ABO A 12/25/2017       Lab Results   Component Value Date    RH Pos 12/25/2017     Rubella status   Lab Results   Component Value Date    RUQIGG Immune 05/10/2017     Rubella: immune  History of depression: none . Postpartum depression warning signs reviewed.    ASSESSMENT/PLAN:  Normal  postpartum exam , Stable Post-partum day #2  Complications:none  Plan d/c home today. Home Visit Ordered- Yes: 1st time breast feeder  RTC 6 weeks  Postpartum warning s/s reviewed, including bleeding/clots, fever, mastitis, or depression  Kegels/ crunches  Continue prenatal vitamins  Birthcontrol planned:Nexplanon  Current Discharge Medication List      START taking these medications    Details   docusate sodium (COLACE) 100 MG tablet Take 100 mg by mouth daily  Qty: 30 tablet, Refills: 0    Associated Diagnoses:  (normal spontaneous vaginal delivery)      acetaminophen (TYLENOL) 325 MG tablet Take 2 tablets (650 mg) by mouth every 4 hours as needed for mild pain or fever (greater than or equal to 38  C /100.4  F (oral) or 38.5  C/ 101.4  F (core).)  Qty: 100 tablet, Refills: 0    Associated Diagnoses:  (normal spontaneous vaginal delivery)      ibuprofen (ADVIL/MOTRIN) 800 MG tablet Take 1 tablet (800 mg) by mouth every 6 hours as needed for other (cramping)  Qty: 90 tablet, Refills: 0    Associated Diagnoses:  (normal spontaneous vaginal delivery)      senna-docusate (SENOKOT-S;PERICOLACE) 8.6-50 MG per tablet Take 1 tablet by mouth 2 times daily as needed for constipation  Qty: 100 tablet, Refills: 0    Associated Diagnoses:  (normal spontaneous vaginal delivery)      Prenatal Vit-DSS-Fe Fum-FA (MYNATE 90 PLUS) TBCR Take 1 tablet by mouth daily  Qty: 100 tablet, Refills: 0    Associated Diagnoses:  (normal spontaneous vaginal delivery)         STOP taking these medications       valACYclovir (VALTREX) 500 MG tablet Comments:   Reason for Stopping:         miconazole ([employer] MICONAZOLE 7) 2 % cream Comments:   Reason for Stopping:           All teaching done, enc. Breastfeeding, d/c home today.  Will likely have nexplanon.    6 week postpartum visit scheduled for , at 1230pm  CLAUDE Lorenzo CNM

## 2017-12-27 NOTE — LACTATION NOTE
"This note was copied from a baby's chart.  Met with Ny on round this am, asked to see for mom with history of pump and bottle feeding first child, lots of colostrum got 35 mL first time pumped & infant spitting up after feedings. No history significant breastfeeding risks or supply other than first baby she pumped for 2 weeks then weaned to formula. Ny reports she had plenty of milk and \"it never went away.\"  When defined, this meant she could still express drops of milk occasionally even a year later, not maintained a supply; this disappeared when she got pregnant, no longer able to express drops.  Breast exam of mother: larger, soft, symmetrical breasts with everted nipples that looked intact initially, but did see tiny (<2mm) raw spot on tip of left nipple that had scant blood; no other redness or damage. Mom reports nipples are more sore today & that he likes to suck just on the end of the nipple. Oral exam of infant WNL.     Education provided on anatomy of breast and infant mouth, importance of good latch and ways to latch deep and maintain it; how to use breast massage, compressions prn during feeding when sleepy, not sucking. Used hands on assist to latch on left side in cross cradle hold, mom report improved comfort, able to see and feel difference.  Had her break seal and re-latch independently and she was able to get similar comfort all on her own, all of lower & most of upper areola in mouth.  Ny reports she may not need to go back to work this time, reviewed benefits of breastfeeding and increased ease of breastfeeding this time now that she is comfortable with how vs. pump & bottle or preparing formula, especially when also have toddler at home. After fed 15 min on one side, infant came off on own, nipple full and rounded. He spit up 2 minutes after feeding, this time with red blood streaked in with milky liquid spilled onto cloth diaper and blanket, and spot of red blood (not mixed) " wiped away from left nostril. Closer examination of left nipple reveal tiny raw spot that did have blood visible on it, but unable to elicit any blood with hand expression and no blood visible when blot nipple with paper towel or glove. Update provided to Dr. Gonzáles on unit with blood tinged spit up with possible link to nipple but seemed like more blood than what would come from the minimal nipple damage visible (& tiny spot vianey blood @ nose). Plan made w/MD to continue to monitor for another hour or so before d/c since this new occurrence, update bedside RN & mom with this info and encouraged to call if spitting up and with next feeding.  Encouraged Ny to feed to cues, burp well afterwards and even hold him upright longer (20 min or so after feeding), try burp again prior to lying down.     Reviewed breastfeeding resources and who to call for help, encourage LC visit outpatient prn if any concerns. Reviewed what to expect in coming days and preventing engorgement. Offered support and encouragment.

## 2017-12-27 NOTE — PLAN OF CARE
Problem: Patient Care Overview  Goal: Plan of Care/Patient Progress Review  Outcome: Therapy, progress toward functional goals as expected  Data: Vital signs within normal limits. Postpartum checks within normal limits - see flow record. Patient eating and drinking normally. Patient able to empty bladder independently and is up ambulating. No apparent signs of infection. Patient performing self cares and is able to care for infant. Breastfeeding infant independently, only minimal assist to help get a deeper latch as mother is having nipple pain. Gave patient hydrogels for comfort. Patient plans to pump and feed baby pumped breastmilk at home, so pt was given a pump overnight--emphasized importance of hand massage prior to pumping and expression after pumping. Pumped 35mL in one pumping session.  Action: Patient medicated during the shift for cramping. See MAR. Patient reassessed within 1 hour after each medication and pain was improved - patient stated she was comfortable. Patient education done about electric breast pump, pain management. See flow record.  Response: Positive attachment behaviors observed with infant.   Plan: Anticipate discharge on 12/27.

## 2018-02-06 PROBLEM — B00.9 HSV (HERPES SIMPLEX VIRUS) INFECTION: Status: RESOLVED | Noted: 2017-12-06 | Resolved: 2018-02-06

## 2024-03-30 ENCOUNTER — HOSPITAL ENCOUNTER (EMERGENCY)
Facility: HOSPITAL | Age: 29
Discharge: HOME | End: 2024-03-30
Attending: EMERGENCY MEDICINE
Payer: MEDICAID

## 2024-03-30 VITALS
HEART RATE: 77 BPM | RESPIRATION RATE: 16 BRPM | OXYGEN SATURATION: 98 % | DIASTOLIC BLOOD PRESSURE: 77 MMHG | BODY MASS INDEX: 43.43 KG/M2 | SYSTOLIC BLOOD PRESSURE: 110 MMHG | HEIGHT: 62 IN | WEIGHT: 236 LBS | TEMPERATURE: 97 F

## 2024-03-30 DIAGNOSIS — J02.9 ACUTE PHARYNGITIS, UNSPECIFIED ETIOLOGY: Primary | ICD-10-CM

## 2024-03-30 LAB — S PYO AG THROAT QL: NEGATIVE

## 2024-03-30 PROCEDURE — 99283 EMERGENCY DEPT VISIT LOW MDM: CPT

## 2024-03-30 PROCEDURE — 87880 STREP A ASSAY W/OPTIC: CPT

## 2024-03-30 RX ORDER — AMOXICILLIN 250 MG/5ML
500 POWDER, FOR SUSPENSION ORAL 2 TIMES DAILY
Qty: 200 ML | Refills: 0 | Status: SHIPPED | OUTPATIENT
Start: 2024-03-30 | End: 2024-04-09

## 2024-03-30 NOTE — ED PROVIDER NOTES
Patient Seen in: [facility] Emergency Department    History     Chief Complaint   Patient presents with    Sore Throat       HPI    28-year-old female presents ER with complaint of sore throat for the past 2 days.  Patient states her children all have strep pharyngitis.  Patient states her pain with swallowing believes she has strep as well.    History reviewed. History reviewed. No pertinent past medical history.    History reviewed. History reviewed. No pertinent surgical history.      Medications :  (Not in a hospital admission)       No family history on file.    Smoking Status:   Social History     Socioeconomic History    Marital status: Single   Tobacco Use    Smoking status: Never    Smokeless tobacco: Never   Substance and Sexual Activity    Alcohol use: Not Currently    Drug use: Not Currently       ROS  All pertinent positives for the review of systems are mentioned in the HPI  All other organ systems are reviewed and are negative.    Constitutional and vital signs reviewed.      Social History and Family History elements reviewed from today, pertinent positives to the presenting problem noted.    Physical Exam     ED Triage Vitals [03/30/24 1022]   /77   Pulse 77   Resp 16   Temp 96.8 °F (36 °C)   Temp src    SpO2 98 %   O2 Device None (Room air)       All measures to prevent infection transmission during my interaction with the patient were taken. The patient was already wearing a droplet mask on my arrival to the room. Personal protective equipment including droplet mask, eye protection, and gloves were worn throughout the duration of the exam.  Handwashing was performed prior to and after the exam.  Stethoscope and any equipment used during my examination was cleaned with super sani-cloth germicidal wipes following the exam.     Physical Exam  Vitals and nursing note reviewed.   Constitutional:       Appearance: She is well-developed.   HENT:      Head: Normocephalic and atraumatic.       Right Ear: External ear normal.      Left Ear: External ear normal.      Nose: Nose normal.      Mouth/Throat:      Pharynx: Pharyngeal swelling and posterior oropharyngeal erythema present. No oropharyngeal exudate.   Eyes:      Conjunctiva/sclera: Conjunctivae normal.      Pupils: Pupils are equal, round, and reactive to light.   Cardiovascular:      Rate and Rhythm: Normal rate and regular rhythm.      Heart sounds: Normal heart sounds.   Pulmonary:      Effort: Pulmonary effort is normal.      Breath sounds: Normal breath sounds.   Abdominal:      General: Bowel sounds are normal.      Palpations: Abdomen is soft.   Musculoskeletal:         General: Normal range of motion.      Cervical back: Normal range of motion and neck supple.   Skin:     General: Skin is warm and dry.   Neurological:      Mental Status: She is alert and oriented to person, place, and time.      Deep Tendon Reflexes: Reflexes are normal and symmetric.   Psychiatric:         Behavior: Behavior normal.         Thought Content: Thought content normal.         Judgment: Judgment normal.         ED Course        Labs Reviewed   POCT RAPID STREP - Normal         Imaging Results Available and Reviewed while in ED: No results found.  ED Medications Administered: Medications - No data to display      MDM     Vitals:    03/30/24 1022   BP: 110/77   Pulse: 77   Resp: 16   Temp: 96.8 °F (36 °C)   SpO2: 98%   Weight: 107 kg   Height: 157.5 cm (5' 2\")     *I personally reviewed and interpreted all ED vitals.  I also personally reviewed all labs and imaging if ordered    Pulse Ox: 98%, Room air, Normal     Monitor Interpretation:   normal sinus rhythm    Differential Diagnosis/ Diagnostic Considerations: Strep pharyngitis, viral pharyngitis, URI    Medical Record Review: I personally reviewed available prior medical records for any recent pertinent discharge summaries, testing, and procedures and reviewed those reports.    Complicating Factors: The  patient already has does not have a problem list on file. to contribute to the complexity of this ED evaluation.    Medical Decision Making  28-year-old female presents ER with complaint of sore throat.  Patient strep pharyngitis swab negative but states that she is symptomatic and patient has close contact with strep.  Patient discharged home with amoxicillin 5 mg to take for the next 10 days.  Patient requesting liquid antibiotics.    Problems Addressed:  Acute pharyngitis, unspecified etiology: acute illness or injury    Amount and/or Complexity of Data Reviewed  Labs: ordered. Decision-making details documented in ED Course.    Risk  Prescription drug management.        Condition upon leaving the department: Stable    Disposition and Plan     Clinical Impression:  1. Acute pharyngitis, unspecified etiology        Disposition:  Discharge    Follow-up:  No follow-up provider specified.    Medications Prescribed:  Current Discharge Medication List        START taking these medications    Details   !! amoxicillin 250 MG/5ML Oral Recon Susp Take 10 mL (500 mg total) by mouth 2 (two) times daily for 10 days.  Qty: 200 mL, Refills: 0      !! amoxicillin 250 MG/5ML Oral Recon Susp Take 10 mL (500 mg total) by mouth 2 (two) times daily for 10 days.  Qty: 200 mL, Refills: 0       !! - Potential duplicate medications found. Please discuss with provider.

## 2024-03-30 NOTE — ED INITIAL ASSESSMENT (HPI)
Pt presents to ED with c/o sore throat starting last night  Pt states all her children have strep throat